# Patient Record
Sex: FEMALE | Race: WHITE | NOT HISPANIC OR LATINO | Employment: PART TIME
[De-identification: names, ages, dates, MRNs, and addresses within clinical notes are randomized per-mention and may not be internally consistent; named-entity substitution may affect disease eponyms.]

---

## 2017-09-03 ENCOUNTER — HEALTH MAINTENANCE LETTER (OUTPATIENT)
Age: 42
End: 2017-09-03

## 2017-10-18 DIAGNOSIS — F33.9 EPISODE OF RECURRENT MAJOR DEPRESSIVE DISORDER, UNSPECIFIED DEPRESSION EPISODE SEVERITY (H): ICD-10-CM

## 2017-10-18 NOTE — TELEPHONE ENCOUNTER
[facility]ax received from [facility] [address], [place]    sertraline (ZOLOFT) 100 MG tablet       Last Written Prescription Date: 10/7/16  Last Fill Quantity: 90, # refills: 3  Last Office Visit with [facility] [place] care provider:  8/25/16 ([facility] [place]um)  Future Office Visit: none        Last PHQ-9 score on record=   PHQ-9 SCORE 8/25/2016   Total Score -   Total Score 15

## 2017-10-19 RX ORDER — SERTRALINE HYDROCHLORIDE 100 MG/1
100 TABLET, FILM COATED ORAL DAILY
Qty: 30 TABLET | Refills: 0 | Status: SHIPPED | OUTPATIENT
Start: 2017-10-19 | End: 2017-11-21

## 2017-10-30 DIAGNOSIS — F32.9 MAJOR DEPRESSION: ICD-10-CM

## 2017-10-30 RX ORDER — SERTRALINE HYDROCHLORIDE 100 MG/1
TABLET, FILM COATED ORAL
Qty: 90 TABLET | Refills: 3 | OUTPATIENT
Start: 2017-10-30

## 2017-11-21 ENCOUNTER — OFFICE VISIT (OUTPATIENT)
Dept: OBGYN | Facility: CLINIC | Age: 42
End: 2017-11-21
Payer: COMMERCIAL

## 2017-11-21 VITALS
WEIGHT: 190 LBS | HEIGHT: 67 IN | SYSTOLIC BLOOD PRESSURE: 100 MMHG | DIASTOLIC BLOOD PRESSURE: 68 MMHG | BODY MASS INDEX: 29.82 KG/M2

## 2017-11-21 DIAGNOSIS — Z12.31 ENCOUNTER FOR SCREENING MAMMOGRAM FOR MALIGNANT NEOPLASM OF BREAST: ICD-10-CM

## 2017-11-21 DIAGNOSIS — G43.109 MIGRAINE WITH AURA AND WITHOUT STATUS MIGRAINOSUS, NOT INTRACTABLE: ICD-10-CM

## 2017-11-21 DIAGNOSIS — Z01.419 ENCOUNTER FOR GYNECOLOGICAL EXAMINATION WITHOUT ABNORMAL FINDING: Primary | ICD-10-CM

## 2017-11-21 DIAGNOSIS — F33.9 EPISODE OF RECURRENT MAJOR DEPRESSIVE DISORDER, UNSPECIFIED DEPRESSION EPISODE SEVERITY (H): ICD-10-CM

## 2017-11-21 DIAGNOSIS — R63.5 WEIGHT GAIN: ICD-10-CM

## 2017-11-21 PROCEDURE — 36415 COLL VENOUS BLD VENIPUNCTURE: CPT | Performed by: NURSE PRACTITIONER

## 2017-11-21 PROCEDURE — 84443 ASSAY THYROID STIM HORMONE: CPT | Performed by: NURSE PRACTITIONER

## 2017-11-21 PROCEDURE — 99396 PREV VISIT EST AGE 40-64: CPT | Performed by: NURSE PRACTITIONER

## 2017-11-21 PROCEDURE — 87624 HPV HI-RISK TYP POOLED RSLT: CPT | Performed by: NURSE PRACTITIONER

## 2017-11-21 PROCEDURE — G0145 SCR C/V CYTO,THINLAYER,RESCR: HCPCS | Performed by: NURSE PRACTITIONER

## 2017-11-21 RX ORDER — SERTRALINE HYDROCHLORIDE 100 MG/1
100 TABLET, FILM COATED ORAL DAILY
Qty: 90 TABLET | Refills: 3 | Status: SHIPPED | OUTPATIENT
Start: 2017-11-21 | End: 2018-12-24

## 2017-11-21 RX ORDER — ELETRIPTAN HYDROBROMIDE 40 MG/1
40 TABLET, FILM COATED ORAL
Qty: 18 TABLET | Refills: 1 | Status: SHIPPED | OUTPATIENT
Start: 2017-11-21 | End: 2019-02-26

## 2017-11-21 ASSESSMENT — ANXIETY QUESTIONNAIRES
2. NOT BEING ABLE TO STOP OR CONTROL WORRYING: MORE THAN HALF THE DAYS
3. WORRYING TOO MUCH ABOUT DIFFERENT THINGS: NOT AT ALL
7. FEELING AFRAID AS IF SOMETHING AWFUL MIGHT HAPPEN: NOT AT ALL
5. BEING SO RESTLESS THAT IT IS HARD TO SIT STILL: NOT AT ALL
6. BECOMING EASILY ANNOYED OR IRRITABLE: NEARLY EVERY DAY
IF YOU CHECKED OFF ANY PROBLEMS ON THIS QUESTIONNAIRE, HOW DIFFICULT HAVE THESE PROBLEMS MADE IT FOR YOU TO DO YOUR WORK, TAKE CARE OF THINGS AT HOME, OR GET ALONG WITH OTHER PEOPLE: SOMEWHAT DIFFICULT
1. FEELING NERVOUS, ANXIOUS, OR ON EDGE: MORE THAN HALF THE DAYS
GAD7 TOTAL SCORE: 8

## 2017-11-21 ASSESSMENT — PATIENT HEALTH QUESTIONNAIRE - PHQ9
5. POOR APPETITE OR OVEREATING: SEVERAL DAYS
SUM OF ALL RESPONSES TO PHQ QUESTIONS 1-9: 8

## 2017-11-21 NOTE — LETTER
11/22/2017     Prema Mike  [id number] [facility] [address]        Prema Mike your lab results came back normal.       Results for orders placed or performed in visit on 11/21/17   TSH   Result Value Ref Range    TSH 2.29 0.40 - 4.00 mU/L       Call with questions. Have a good year!    Cordially,    NEO Durham CNP

## 2017-11-21 NOTE — PROGRESS NOTES
"  Prema is a 42 year old  female who presents for annual exam.     Besides routine health maintenance, has some health concerns to discuss with provider.    HPI:  Pt here today for her annual exam. She is frustrated with continual weight gain since having her 3rd child. She never did loose the \"baby weight\" and has gone up since. She also needs a refill of her zoloft and relpax.     She has never had a mammogram. She was not aware that they should be done yearly.     We will update her pap smear today. She has had a LEEP when she was in her \"teens\".   The patient has primary care with ANTONY Alvarez as needed.      GYNECOLOGIC HISTORY:    Patient's last menstrual period was 2017.  Her current contraception method is: condoms.  She  reports that she has never smoked. She has never used smokeless tobacco.  Patient is sexually active.  STD testing offered?  Declined     Last PHQ-9 score on record =   PHQ-9 SCORE 2017   Total Score -   Total Score 8     Last GAD7 score on record =   GALI-7 SCORE 2017   Total Score -   Total Score 8     Alcohol Score = 2    HEALTH MAINTENANCE:  Cholesterol:   Cholesterol   Date Value Ref Range Status   2015 157 115 - 199 mg/dL Final   2013 219 (A) 125 - 200 mg/dL Final   Last Mammo: has never had mammo, needs baseline, will schedule  Pap:   Lab Results   Component Value Date    PAP NIL 2008    PAP NIL 2007   Colonoscopy:  N/A, Result: not applicable, Next Colonoscopy: due age 50  Dexa:  Never  Health maintenance updated:  yes    HISTORY:  Obstetric History       T2      L2     SAB0   TAB0   Ectopic0   Multiple0   Live Births2       # Outcome Date GA Lbr Rylan/2nd Weight Sex Delivery Anes PTL Lv   3 Term 06/15/16 38w1d 03:05 / 00:11 6 lb 15.8 oz (3.17 kg) M Vag-Spont Local        Apgar1:  8                Apgar5: 9   2 Term 07 38w0d  7 lb 12 oz (3.515 kg) F  EPI  SHANNAN      Name: Kimi   1  04 36w0d  6 lb 9 oz " (2.977 kg) M  EPI  SHANNAN      Name: Susi          Patient Active Problem List   Diagnosis     Migraine with aura     Depressive disorder, not elsewhere classified     Rash and other nonspecific skin eruption     Depression     Spastic colon     Herpes simplex     Cervical dysplasia     Low HDL (under 40)     Anxiety     Advanced maternal age in multigravida, first trimester     Advanced maternal age in multigravida, second trimester     Depression affecting pregnancy, antepartum     Club foot, fetal, affecting care of mother, antepartum, single gestation     Advanced maternal age in multigravida, third trimester     Multigravida of advanced maternal age in third trimester     Indication for care or intervention in labor or delivery     Pregnancy     Past Surgical History:   Procedure Laterality Date     LEEP TX, CERVICAL      done elsewhere      Social History   Substance Use Topics     Smoking status: Never Smoker     Smokeless tobacco: Never Used     Alcohol use 0.0 oz/week     0 Standard drinks or equivalent per week      Comment: 2 beers a week      Problem (# of Occurrences) Relation (Name,Age of Onset)    Cervical Cancer (2) Mother, Sister    Colon Cancer (1) Paternal Grandfather    Coronary Artery Disease (2) Paternal Grandfather, Maternal Grandfather    DIABETES (1) Paternal Grandfather    Hyperlipidemia (8) Paternal Grandfather, Paternal Grandmother, Brother, Mother, Maternal Grandmother, Father, Sister, Other (aunt)    Hypertension (2) Paternal Grandfather, Mother    Other Cancer (1) Maternal Grandmother: OVARIAN / UTERINE             Current Outpatient Prescriptions   Medication Sig     Multiple Vitamins-Minerals (MULTIVITAMIN PO)      sertraline (ZOLOFT) 100 MG tablet Take 1 tablet (100 mg) by mouth daily     eletriptan (RELPAX) 40 MG tablet Take 1 tablet (40 mg) by mouth at onset of headache for migraine May repeat dose in 2 hours.  Do not exceed 80 mg in 24 hours     [DISCONTINUED] sertraline (ZOLOFT)  "100 MG tablet Take 1 tablet (100 mg) by mouth daily     No current facility-administered medications for this visit.      Allergies   Allergen Reactions     No Known Allergies        Past medical, surgical, social and family histories were reviewed and updated in EPIC.    ROS:   12 point review of systems negative other than symptoms noted below.  Constitutional: Fatigue and Weight Gain  Eyes: Vision Loss  Breast: Tenderness  Gastrointestinal: Abdominal Pain and Bloating  Genitourinary: Night Sweats and Pelvic Pain  Skin: Acne and Skin Dryness  Musculoskeletal: Joint Pain  Psychiatric: Anxiety and Anaya    EXAM:  /68  Ht 5' 7\" (1.702 m)  Wt 190 lb (86.2 kg)  LMP 11/01/2017  BMI 29.76 kg/m2   BMI: Body mass index is 29.76 kg/(m^2).    PHYSICAL EXAM:  Constitutional:  Appearance: Well nourished, well developed, alert, in no acute distress  Neck:  Lymph Nodes:  No lymphadenopathy present    Thyroid:  Gland size normal, nontender, no nodules or masses present  on palpation  Chest:  Respiratory Effort:  Breathing unlabored  Cardiovascular:    Heart: Auscultation:  Regular rate, normal rhythm, no murmurs present  Breasts: Inspection of Breasts:  No lymphadenopathy present., Palpation of Breasts and Axillae:  No masses present on palpation, no breast tenderness., Axillary Lymph Nodes:  No lymphadenopathy present. and No nodularity, asymmetry or nipple discharge bilaterally.  Gastrointestinal:   Abdominal Examination:  Abdomen nontender to palpation, tone normal without rigidity or guarding, no masses present, umbilicus without lesions   Liver and Spleen:  No hepatomegaly present, liver nontender to palpation    Hernias:  No hernias present  Lymphatic: Lymph Nodes:  No other lymphadenopathy present  Skin:  General Inspection:  No rashes present, no lesions present, no areas of  discoloration    Genitalia and Groin:  No rashes present, no lesions present, no areas of  discoloration, no masses " present  Neurologic/Psychiatric:    Mental Status:  Oriented X3     Pelvic Exam:  External Genitalia:     Normal appearance for age, no discharge present, no tenderness present, no inflammatory lesions present, color normal  Vagina:     Normal vaginal vault without central or paravaginal defects, no discharge present, no inflammatory lesions present, no masses present  Bladder:     Nontender to palpation  Urethra:   Urethral Body:  Urethra palpation normal, urethra structural support normal   Urethral Meatus:  No erythema or lesions present  Cervix:     Appearance healthy, no lesions present, nontender to palpation, no bleeding present, S/P LEEP  Uterus:     Uterus: firm, normal sized and nontender, anteverted in position.   Adnexa:     No adnexal tenderness present, no adnexal masses present  Perineum:     Perineum within normal limits, no evidence of trauma, no rashes or skin lesions present  Anus:     Anus within normal limits, no hemorrhoids present  Inguinal Lymph Nodes:     No lymphadenopathy present  Pubic Hair:     Normal pubic hair distribution for age  Genitalia and Groin:     No rashes present, no lesions present, no areas of discoloration, no masses present      COUNSELING:   Special attention given to:        Regular exercise       Healthy diet/nutrition    BMI: Body mass index is 29.76 kg/(m^2).  Weight management plan: Discussed healthy diet and exercise guidelines and patient will follow up in 12 months in clinic to re-evaluate.    ASSESSMENT:  42 year old female with satisfactory annual exam.    ICD-10-CM    1. Encounter for gynecological examination without abnormal finding Z01.419 Pap imaged thin layer screen with HPV - recommended age 30 - 65     HPV High Risk Types DNA Cervical   2. Episode of recurrent major depressive disorder, unspecified depression episode severity (H) F33.9 sertraline (ZOLOFT) 100 MG tablet   3. Encounter for screening mammogram for malignant neoplasm of breast Z12.31 *MA  Screening Digital Bilateral   4. Migraine with aura and without status migrainosus, not intractable G43.109 eletriptan (RELPAX) 40 MG tablet   5. Weight gain R63.5 TSH       PLAN:  Overweight female, needs mammogram. TSH for weight gain. She needs to ramp up her exercise and protein intake.     NEO Durham CNP

## 2017-11-21 NOTE — MR AVS SNAPSHOT
"              After Visit Summary   11/21/2017    Prema Mike    MRN: [id number]           Patient Information     Date Of Birth          1975        Visit Information        Provider Department      11/21/2017 10:00 AM Shefali Waldron APRN CNP [facility]        Today's Diagnoses     Encounter for gynecological examination without abnormal finding    -  1    Episode of recurrent major depressive disorder, unspecified depression episode severity (H)        Encounter for screening mammogram for malignant neoplasm of breast        Migraine with aura and without status migrainosus, not intractable        Weight gain           Follow-ups after your visit        Follow-up notes from your care team     Return in about 1 year (around 11/21/2018).      Your next 10 appointments already scheduled     Dec 04, 2017 10:00 AM NALLELY   MA SCREENING DIGITAL BILATERAL with WEMA1   [facility]    [address] [place] [address], [address]  [facility] [address] [phone]           Do not use any powder, lotion or deodorant under your arms or on your breast. If you do, we will ask you to remove it before your exam.  Wear comfortable, two-piece clothing.  If you have any allergies, tell your care team.  Bring any previous mammograms from other facilities or have them mailed to the breast center. Three-dimensional (3D) mammograms are available at [facility] locations in [facility], [facility], [facility], and [place]. [facility] locations include [place] and [facility] in [place]. Benefits of 3D mammograms include: - Improved rate of cancer detection - Decreases your chance of having to go back for more tests, which means fewer: - \"False-positive\" results (This means that there is an abnormal area but it isn't cancer.) - Invasive testing procedures, such as a biopsy or surgery - Can provide clearer " "images of the breast if you have dense breast tissue. 3D mammography is an optional exam that anyone can have with a 2D mammogram. It doesn't replace or take the place of a 2D mammogram. 2D mammograms remain an effective screening test for all women.  Not all insurance companies cover the cost of a 3D mammogram. Check with your insurance.              Future tests that were ordered for you today     Open Future Orders        Priority Expected Expires Ordered    *MA Screening Digital Bilateral Routine  11/21/2018 11/21/2017            Who to contact     If you have questions or need follow up information about today's clinic visit or your schedule please contact [facility] directly at [phone].  Normal or non-critical lab and imaging results will be communicated to you by Novatrishart, letter or phone within 4 business days after the clinic has received the results. If you do not hear from us within 7 days, please contact the clinic through Intentivat or phone. If you have a critical or abnormal lab result, we will notify you by phone as soon as possible.  Submit refill requests through CloudAmboÂ® or call your pharmacy and they will forward the refill request to us. Please allow 3 business days for your refill to be completed.          Additional Information About Your Visit        CloudAmboÂ® Information     CloudAmboÂ® gives you secure access to your electronic health record. If you see a primary care provider, you can also send messages to your care team and make appointments. If you have questions, please call your primary care clinic.  If you do not have a primary care provider, please call [phone] and they will assist you.        Care EveryWhere ID     This is your Care EveryWhere ID. This could be used by other organizations to access your [facility] medical records  [id number]        Your Vitals Were     Height Last Period BMI (Body Mass Index)             5' 7\" (1.702 m) 11/01/2017 29.76 kg/m2    "       Blood Pressure from Last 3 Encounters:   11/21/17 100/68   08/25/16 112/70   06/16/16 119/70    Weight from Last 3 Encounters:   11/21/17 190 lb (86.2 kg)   08/25/16 178 lb (80.7 kg)   06/09/16 189 lb (85.7 kg)              We Performed the Following     HPV High Risk Types DNA Cervical     Pap imaged thin layer screen with HPV - recommended age 30 - 65     TSH          Today's Medication Changes          These changes are accurate as of: 11/21/17 10:55 AM.  If you have any questions, ask your nurse or doctor.               Stop taking these medicines if you haven't already. Please contact your care team if you have questions.     prenatal multivitamin plus iron 27-0.8 MG Tabs per tablet   Stopped by:  Shefali Waldron APRN CNP                Where to get your medicines      These medications were sent to [place] [id number] [address]  [facility]  [address]  [place] , [facility] [address]     Phone:  [phone]     eletriptan 40 MG tablet    sertraline 100 MG tablet                Primary Care Provider Office Phone # Fax #    Leonor Justen Govea [phone]       [facility] [address] [facility] [address]        Equal Access to Services     YAMINI GE AH: Hadii darren lunsford hadasho Soomaali, waaxda luqadaha, qaybta kaalmada adeegyada, abel johnson haykatherine ho. So [facility] [phone].    ATENCIÓN: Si habla español, tiene a schuler disposición servicios gratuitos de asistencia lingüística. [facility] [phone].    We comply with applicable federal civil rights laws and [place] laws. We do not discriminate on the basis of race, color, national origin, age, disability, sex, sexual orientation, or gender identity.            Thank you!     Thank you for choosing [facility]  for your care. Our goal is always to provide you with excellent care. Hearing back from our patients is one way we can continue to improve our  services. Please take a few minutes to complete the written survey that you may receive in the mail after your visit with us. Thank you!             Your Updated Medication List - Protect others around you: Learn how to safely use, store and throw away your medicines at www.disposemymeds.org.          This list is accurate as of: 11/21/17 10:55 AM.  Always use your most recent med list.                   Brand Name Dispense Instructions for use Diagnosis    eletriptan 40 MG tablet    RELPAX    18 tablet    Take 1 tablet (40 mg) by mouth at onset of headache for migraine May repeat dose in 2 hours.  Do not exceed 80 mg in 24 hours    Migraine with aura and without status migrainosus, not intractable       MULTIVITAMIN PO           sertraline 100 MG tablet    ZOLOFT    90 tablet    Take 1 tablet (100 mg) by mouth daily    Episode of recurrent major depressive disorder, unspecified depression episode severity (H)

## 2017-11-22 LAB — TSH SERPL DL<=0.005 MIU/L-ACNC: 2.29 MU/L (ref 0.4–4)

## 2017-11-22 ASSESSMENT — ANXIETY QUESTIONNAIRES: GAD7 TOTAL SCORE: 8

## 2017-11-26 ENCOUNTER — HEALTH MAINTENANCE LETTER (OUTPATIENT)
Age: 42
End: 2017-11-26

## 2017-11-27 LAB
COPATH REPORT: NORMAL
PAP: NORMAL

## 2017-11-28 LAB
FINAL DIAGNOSIS: NORMAL
HPV HR 12 DNA CVX QL NAA+PROBE: NEGATIVE
HPV16 DNA SPEC QL NAA+PROBE: NEGATIVE
HPV18 DNA SPEC QL NAA+PROBE: NEGATIVE
SPECIMEN DESCRIPTION: NORMAL

## 2017-12-04 ENCOUNTER — RADIANT APPOINTMENT (OUTPATIENT)
Dept: MAMMOGRAPHY | Facility: CLINIC | Age: 42
End: 2017-12-04
Payer: COMMERCIAL

## 2017-12-04 DIAGNOSIS — Z12.31 ENCOUNTER FOR SCREENING MAMMOGRAM FOR MALIGNANT NEOPLASM OF BREAST: ICD-10-CM

## 2017-12-04 PROCEDURE — G0202 SCR MAMMO BI INCL CAD: HCPCS | Mod: TC

## 2018-12-24 DIAGNOSIS — F33.9 EPISODE OF RECURRENT MAJOR DEPRESSIVE DISORDER, UNSPECIFIED DEPRESSION EPISODE SEVERITY (H): ICD-10-CM

## 2018-12-24 RX ORDER — SERTRALINE HYDROCHLORIDE 100 MG/1
100 TABLET, FILM COATED ORAL DAILY
Qty: 30 TABLET | Refills: 0 | Status: SHIPPED | OUTPATIENT
Start: 2018-12-24 | End: 2019-02-16

## 2018-12-24 NOTE — TELEPHONE ENCOUNTER
"Requested Prescriptions   Pending Prescriptions Disp Refills     sertraline (ZOLOFT) 100 MG tablet 90 tablet 3     Sig: Take 1 tablet (100 mg) by mouth daily    SSRIs Protocol Failed - 12/24/2018  9:31 AM       Failed - PHQ-9 score less than 5 in past 6 months    Please review last PHQ-9 score.          Failed - Recent (6 mo) or future (30 days) visit within the authorizing provider's specialty    Patient had office visit in the last 6 months or has a visit in the next 30 days with authorizing provider or within the authorizing provider's specialty.  See \"Patient Info\" tab in inbasket, or \"Choose Columns\" in Meds & Orders section of the refill encounter.           Passed - Patient is age 18 or older       Passed - No active pregnancy on record       Passed - No positive pregnancy test in last 12 months        Last Written Prescription Date:  11/21/17  Last Fill Quantity: 90,  # refills: 3   Last office visit: 11/21/2017 with prescribing provider:  Shefali Waldron   Future Office Visit: None    "

## 2018-12-24 NOTE — TELEPHONE ENCOUNTER
Medication is being filled for 1 time refill only due to:  Patient needs to be seen because it has been more than one year since last visit. Message sent to pharmacy to inform pt no further refills until seen in office for annual med check.

## 2019-01-21 DIAGNOSIS — F33.9 EPISODE OF RECURRENT MAJOR DEPRESSIVE DISORDER, UNSPECIFIED DEPRESSION EPISODE SEVERITY (H): ICD-10-CM

## 2019-01-21 RX ORDER — SERTRALINE HYDROCHLORIDE 100 MG/1
100 TABLET, FILM COATED ORAL DAILY
Qty: 30 TABLET | Refills: 0 | OUTPATIENT
Start: 2019-01-21

## 2019-01-21 NOTE — TELEPHONE ENCOUNTER
Pt due for annual, no appt scheduled. Pt already received one month extension. Rx denied.  Pt needs appointment

## 2019-01-21 NOTE — TELEPHONE ENCOUNTER
"Requested Prescriptions   Pending Prescriptions Disp Refills     sertraline (ZOLOFT) 100 MG tablet 30 tablet 0     Sig: Take 1 tablet (100 mg) by mouth daily    SSRIs Protocol Failed - 1/21/2019  2:16 PM       Failed - PHQ-9 score less than 5 in past 6 months    Please review last PHQ-9 score.          Failed - Recent (6 mo) or future (30 days) visit within the authorizing provider's specialty    Patient had office visit in the last 6 months or has a visit in the next 30 days with authorizing provider or within the authorizing provider's specialty.  See \"Patient Info\" tab in inbasket, or \"Choose Columns\" in Meds & Orders section of the refill encounter.           Passed - Medication is active on med list       Passed - Patient is age 18 or older       Passed - No active pregnancy on record       Passed - No positive pregnancy test in last 12 months        Last Written Prescription Date:  12/24/2018  Last Fill Quantity: 30,  # refills: 0   Last office visit: 11/21/2017 with prescribing provider:  Annual with Chivo   Future Office Visit:  NA    "

## 2019-01-23 DIAGNOSIS — F33.9 EPISODE OF RECURRENT MAJOR DEPRESSIVE DISORDER, UNSPECIFIED DEPRESSION EPISODE SEVERITY (H): ICD-10-CM

## 2019-01-23 RX ORDER — SERTRALINE HYDROCHLORIDE 100 MG/1
100 TABLET, FILM COATED ORAL DAILY
Qty: 30 TABLET | Refills: 0 | OUTPATIENT
Start: 2019-01-23

## 2019-01-23 NOTE — TELEPHONE ENCOUNTER
"Requested Prescriptions   Pending Prescriptions Disp Refills     sertraline (ZOLOFT) 100 MG tablet 30 tablet 0     Sig: Take 1 tablet (100 mg) by mouth daily    SSRIs Protocol Failed - 1/23/2019  3:44 PM       Failed - PHQ-9 score less than 5 in past 6 months    Please review last PHQ-9 score.          Failed - Recent (6 mo) or future (30 days) visit within the authorizing provider's specialty    Patient had office visit in the last 6 months or has a visit in the next 30 days with authorizing provider or within the authorizing provider's specialty.  See \"Patient Info\" tab in inbasket, or \"Choose Columns\" in Meds & Orders section of the refill encounter.           Passed - Medication is active on med list       Passed - Patient is age 18 or older       Passed - No active pregnancy on record       Passed - No positive pregnancy test in last 12 months          Pt due for annual, no appt scheduled. Pt already received one month extension. Rx denied.   Mulu Crockett RN on 1/23/2019 at 3:45 PM    "

## 2019-02-09 DIAGNOSIS — F33.9 EPISODE OF RECURRENT MAJOR DEPRESSIVE DISORDER, UNSPECIFIED DEPRESSION EPISODE SEVERITY (H): ICD-10-CM

## 2019-02-11 RX ORDER — SERTRALINE HYDROCHLORIDE 100 MG/1
TABLET, FILM COATED ORAL
Qty: 30 TABLET | Refills: 0 | OUTPATIENT
Start: 2019-02-11

## 2019-02-11 NOTE — TELEPHONE ENCOUNTER
"Requested Prescriptions   Pending Prescriptions Disp Refills     sertraline (ZOLOFT) 100 MG tablet [Pharmacy Med Name: SERTRALINE  MG TABLET] 30 tablet 0     Sig: TAKE 1 TABLET BY MOUTH EVERY DAY. MAKE APPT    SSRIs Protocol Failed - 2/9/2019  4:16 PM       Failed - PHQ-9 score less than 5 in past 6 months    Please review last PHQ-9 score.          Failed - Recent (6 mo) or future (30 days) visit within the authorizing provider's specialty    Patient had office visit in the last 6 months or has a visit in the next 30 days with authorizing provider or within the authorizing provider's specialty.  See \"Patient Info\" tab in inbasket, or \"Choose Columns\" in Meds & Orders section of the refill encounter.           Passed - Medication is active on med list       Passed - Patient is age 18 or older       Passed - No active pregnancy on record       Passed - No positive pregnancy test in last 12 months      Pt due for annual, no appt scheduled. Pt already received one month extension. Rx denied.   Mulu Crockett RN on 2/11/2019 at 10:18 AM      "

## 2019-02-16 DIAGNOSIS — F33.9 EPISODE OF RECURRENT MAJOR DEPRESSIVE DISORDER, UNSPECIFIED DEPRESSION EPISODE SEVERITY (H): ICD-10-CM

## 2019-02-18 RX ORDER — SERTRALINE HYDROCHLORIDE 100 MG/1
TABLET, FILM COATED ORAL
Qty: 30 TABLET | Refills: 0 | Status: SHIPPED | OUTPATIENT
Start: 2019-02-18 | End: 2019-02-26

## 2019-02-18 NOTE — TELEPHONE ENCOUNTER
"Requested Prescriptions   Pending Prescriptions Disp Refills     sertraline (ZOLOFT) 100 MG tablet [Pharmacy Med Name: SERTRALINE  MG TABLET] 30 tablet 0     Sig: TAKE 1 TABLET BY MOUTH EVERY DAY. MAKE APPT    SSRIs Protocol Failed - 2/16/2019  3:42 PM       Failed - PHQ-9 score less than 5 in past 6 months    Please review last PHQ-9 score.          Failed - Recent (6 mo) or future (30 days) visit within the authorizing provider's specialty    Patient had office visit in the last 6 months or has a visit in the next 30 days with authorizing provider or within the authorizing provider's specialty.  See \"Patient Info\" tab in inbasket, or \"Choose Columns\" in Meds & Orders section of the refill encounter.           Passed - Medication is active on med list       Passed - Patient is age 18 or older       Passed - No active pregnancy on record       Passed - No positive pregnancy test in last 12 months      Last Written Prescription Date:  12/24/18  Last Fill Quantity: 30,  # refills: 0   Last office visit: 11/21/2017 with prescribing provider:  Shefali Waldron   Future Office Visit:  None    Pt due for annual, no appt scheduled. Pt already received one month extension. Rx denied.   Mulu Crockett RN on 2/18/2019 at 11:00 AM        "

## 2019-02-25 NOTE — PROGRESS NOTES
Prema is a 43 year old  female who presents for annual exam.     Besides routine health maintenance, she has no other health concerns today .    HPI:  The patient's PCP is Leonor Govea MD.  Pt here today for her annual exam and mammogram. She is due for her lipids. Last in . Is not fasting today. Will order for future.     Needs refills on Relpax and sertraline.     Had vulvar varicosities when pregnant, and they were fine up until a few weeks ago. She recently had one rupture in the shower.     S/p Leep in her teens. NIL since. Will pap every 3 years. Last pap in , NIL neg HPV      GYNECOLOGIC HISTORY:    Patient's last menstrual period was 2019.  Her current contraception method is: condoms.  She  reports that  has never smoked. she has never used smokeless tobacco.    Patient is sexually active.  STD testing offered?  Declined  Last PHQ-9 score on record =   PHQ-9 SCORE 2019   PHQ-9 Total Score -   PHQ-9 Total Score 8     Last GAD7 score on record =   GALI-7 SCORE 2019   Total Score -   Total Score 11     Alcohol Score = 2    HEALTH MAINTENANCE:  Cholesterol:   Cholesterol   Date Value Ref Range Status   2015 157 115 - 199 mg/dL Final   2013 219 (A) 125 - 200 mg/dL Final     Last Mammo: 17, Result: normal, Next Mammo: today   Pap:   Lab Results   Component Value Date    PAP NIL HPV NEG 2017    PAP NIL 2008    PAP NIL 2007      Colonoscopy:  NEVER, Result: not applicable, Next Colonoscopy: Due age 50.   Dexa:  NA    Health maintenance updated:  yes    HISTORY:  Obstetric History       T2      L2     SAB0   TAB0   Ectopic0   Multiple0   Live Births3       # Outcome Date GA Lbr Rylan/2nd Weight Sex Delivery Anes PTL Lv   3 Term 06/15/16 38w1d 03:05 / 00:11 3.17 kg (6 lb 15.8 oz) M Vag-Spont Local        Apgar1:  8                Apgar5: 9   2 Term 07 38w0d  3.515 kg (7 lb 12 oz) F  EPI  SHANNAN      Name: Kimi Cm   04 36w0d  2.977 kg (6 lb 9 oz) M  EPI  SHANNAN      Name: Susi          Patient Active Problem List   Diagnosis     Migraine with aura     Depressive disorder, not elsewhere classified     Rash and other nonspecific skin eruption     Depression     Spastic colon     Herpes simplex     Low HDL (under 40)     Anxiety     Advanced maternal age in multigravida, first trimester     Advanced maternal age in multigravida, second trimester     Depression affecting pregnancy, antepartum     Club foot, fetal, affecting care of mother, antepartum, single gestation     Advanced maternal age in multigravida, third trimester     Multigravida of advanced maternal age in third trimester     Indication for care or intervention in labor or delivery     Pregnancy     Past Surgical History:   Procedure Laterality Date     LEEP TX, CERVICAL      done elsewhere      Social History     Tobacco Use     Smoking status: Never Smoker     Smokeless tobacco: Never Used   Substance Use Topics     Alcohol use: Yes     Alcohol/week: 0.0 oz     Frequency: 2-4 times a month     Drinks per session: 1 or 2     Binge frequency: Never     Comment: 2 beers a week      Problem (# of Occurrences) Relation (Name,Age of Onset)    Cervical Cancer (2) Mother, Sister    Colon Cancer (1) Paternal Grandfather    Coronary Artery Disease (2) Paternal Grandfather, Maternal Grandfather    Diabetes (1) Paternal Grandfather    Hyperlipidemia (8) Paternal Grandfather, Paternal Grandmother, Brother, Mother, Maternal Grandmother, Father, Sister, Other (aunt)    Hypertension (2) Paternal Grandfather, Mother    Other Cancer (1) Maternal Grandmother: OVARIAN / UTERINE             Current Outpatient Medications   Medication Sig     eletriptan (RELPAX) 40 MG tablet Take 1 tablet (40 mg) by mouth at onset of headache for migraine May repeat dose in 2 hours.  Do not exceed 80 mg in 24 hours     Multiple Vitamins-Minerals (MULTIVITAMIN PO)      sertraline (ZOLOFT) 100  "MG tablet Take 1 tablet (100 mg) by mouth daily     No current facility-administered medications for this visit.      Allergies   Allergen Reactions     No Known Allergies        Past medical, surgical, social and family histories were reviewed and updated in EPIC.    ROS:   12 point review of systems negative other than symptoms noted below.  Constitutional: Fatigue  Eyes: Vision Loss  Gastrointestinal: Bloating  Genitourinary: Cramps, Heavy Bleeding with Period, Hot Flashes, Night Sweats and Vaginal Discharge  Skin: Acne and Skin Dryness  Psychiatric: Anxiety, Depression, Difficulty Sleeping and Anaya    EXAM:  /64   Pulse 68   Ht 1.702 m (5' 7\")   Wt 88.9 kg (196 lb)   LMP 02/01/2019   BMI 30.70 kg/m     BMI: Body mass index is 30.7 kg/m .    PHYSICAL EXAM:  Constitutional:  Appearance: Well nourished, well developed, alert, in no acute distress  Neck:  Lymph Nodes:  No lymphadenopathy present    Thyroid:  Gland size normal, nontender, no nodules or masses present  on palpation  Chest:  Respiratory Effort:  Breathing unlabored  Cardiovascular:    Heart: Auscultation:  Regular rate, normal rhythm, no murmurs present  Breasts: Inspection of Breasts:  No lymphadenopathy present., Palpation of Breasts and Axillae:  No masses present on palpation, no breast tenderness., Axillary Lymph Nodes:  No lymphadenopathy present. and No nodularity, asymmetry or nipple discharge bilaterally.  Gastrointestinal:   Abdominal Examination:  Abdomen nontender to palpation, tone normal without rigidity or guarding, no masses present, umbilicus without lesions   Liver and Spleen:  No hepatomegaly present, liver nontender to palpation    Hernias:  No hernias present  Lymphatic: Lymph Nodes:  No other lymphadenopathy present  Skin:  General Inspection:  No rashes present, no lesions present, no areas of  discoloration    Genitalia and Groin:  No rashes present, no lesions present, no areas of  discoloration, no masses " present  Neurologic/Psychiatric:    Mental Status:  Oriented X3     Pelvic Exam:  External Genitalia:     Normal appearance for age, no discharge present, no tenderness present, no inflammatory lesions present, color normal- multiple bilateral vulvar varicosities noted. One on lower 1/3 labia is tender.   Vagina:     Normal vaginal vault without central or paravaginal defects, no discharge present, no inflammatory lesions present, no masses present  Bladder:     Nontender to palpation  Urethra:   Urethral Body:  Urethra palpation normal, urethra structural support normal   Urethral Meatus:  No erythema or lesions present  Cervix:     Appearance healthy, no lesions present, nontender to palpation, no bleeding present  Uterus:     Uterus: firm, normal sized and nontender, anteverted in position.   Adnexa:     No adnexal tenderness present, no adnexal masses present  Perineum:     Perineum within normal limits, no evidence of trauma, no rashes or skin lesions present  Anus:     Anus within normal limits, no hemorrhoids present  Inguinal Lymph Nodes:     No lymphadenopathy present  Pubic Hair:     Normal pubic hair distribution for age  Genitalia and Groin:     No rashes present, no lesions present, no areas of discoloration, no masses present      COUNSELING:   Special attention given to:        Regular exercise       Healthy diet/nutrition       Contraception    BMI: Body mass index is 30.7 kg/m .  Weight management plan: Discussed healthy diet and exercise guidelines    ASSESSMENT:  43 year old female with satisfactory annual exam.    ICD-10-CM    1. Encounter for gynecological examination without abnormal finding Z01.419    2. Episode of recurrent major depressive disorder, unspecified depression episode severity (H) F33.9 sertraline (ZOLOFT) 100 MG tablet   3. Vulval varices I86.3    4. Encounter for lipid screening for cardiovascular disease Z13.220 Lipid panel reflex to direct LDL Fasting    Z13.6    5. Screening  for metabolic disorder Z13.228 **Comprehensive metabolic panel FUTURE anytime   6. Migraine with aura and without status migrainosus, not intractable G43.109 eletriptan (RELPAX) 40 MG tablet       PLAN:  Normal gyn exam. Varicosities noted. Discussed sclerotherapy if they are symptomatic. Pt will think about it. Fasting labs in future. meds refilled. No pap collected today. Pap NIL neg HPV last year. Will pap every 3 years.     NEO Durham CNP

## 2019-02-26 ENCOUNTER — ANCILLARY PROCEDURE (OUTPATIENT)
Dept: MAMMOGRAPHY | Facility: CLINIC | Age: 44
End: 2019-02-26
Payer: COMMERCIAL

## 2019-02-26 ENCOUNTER — OFFICE VISIT (OUTPATIENT)
Dept: OBGYN | Facility: CLINIC | Age: 44
End: 2019-02-26
Payer: COMMERCIAL

## 2019-02-26 VITALS
DIASTOLIC BLOOD PRESSURE: 64 MMHG | BODY MASS INDEX: 30.76 KG/M2 | HEIGHT: 67 IN | SYSTOLIC BLOOD PRESSURE: 112 MMHG | HEART RATE: 68 BPM | WEIGHT: 196 LBS

## 2019-02-26 DIAGNOSIS — I86.3 VULVAL VARICES: ICD-10-CM

## 2019-02-26 DIAGNOSIS — Z13.220 ENCOUNTER FOR LIPID SCREENING FOR CARDIOVASCULAR DISEASE: ICD-10-CM

## 2019-02-26 DIAGNOSIS — Z13.6 ENCOUNTER FOR LIPID SCREENING FOR CARDIOVASCULAR DISEASE: ICD-10-CM

## 2019-02-26 DIAGNOSIS — Z01.419 ENCOUNTER FOR GYNECOLOGICAL EXAMINATION WITHOUT ABNORMAL FINDING: Primary | ICD-10-CM

## 2019-02-26 DIAGNOSIS — Z12.31 VISIT FOR SCREENING MAMMOGRAM: ICD-10-CM

## 2019-02-26 DIAGNOSIS — Z13.228 SCREENING FOR METABOLIC DISORDER: ICD-10-CM

## 2019-02-26 DIAGNOSIS — F33.9 EPISODE OF RECURRENT MAJOR DEPRESSIVE DISORDER, UNSPECIFIED DEPRESSION EPISODE SEVERITY (H): ICD-10-CM

## 2019-02-26 DIAGNOSIS — G43.109 MIGRAINE WITH AURA AND WITHOUT STATUS MIGRAINOSUS, NOT INTRACTABLE: ICD-10-CM

## 2019-02-26 PROCEDURE — 99396 PREV VISIT EST AGE 40-64: CPT | Performed by: NURSE PRACTITIONER

## 2019-02-26 PROCEDURE — 77063 BREAST TOMOSYNTHESIS BI: CPT | Mod: TC

## 2019-02-26 PROCEDURE — 77067 SCR MAMMO BI INCL CAD: CPT | Mod: TC

## 2019-02-26 RX ORDER — ELETRIPTAN HYDROBROMIDE 40 MG/1
40 TABLET, FILM COATED ORAL
Qty: 18 TABLET | Refills: 1 | Status: SHIPPED | OUTPATIENT
Start: 2019-02-26 | End: 2020-09-29

## 2019-02-26 RX ORDER — SERTRALINE HYDROCHLORIDE 100 MG/1
100 TABLET, FILM COATED ORAL DAILY
Qty: 90 TABLET | Refills: 3 | Status: SHIPPED | OUTPATIENT
Start: 2019-02-26 | End: 2020-04-21

## 2019-02-26 SDOH — HEALTH STABILITY: MENTAL HEALTH: HOW OFTEN DO YOU HAVE A DRINK CONTAINING ALCOHOL?: 2-4 TIMES A MONTH

## 2019-02-26 SDOH — HEALTH STABILITY: MENTAL HEALTH: HOW MANY STANDARD DRINKS CONTAINING ALCOHOL DO YOU HAVE ON A TYPICAL DAY?: 1 OR 2

## 2019-02-26 SDOH — HEALTH STABILITY: MENTAL HEALTH: HOW OFTEN DO YOU HAVE 6 OR MORE DRINKS ON ONE OCCASION?: NEVER

## 2019-02-26 ASSESSMENT — PATIENT HEALTH QUESTIONNAIRE - PHQ9
SUM OF ALL RESPONSES TO PHQ QUESTIONS 1-9: 8
5. POOR APPETITE OR OVEREATING: SEVERAL DAYS

## 2019-02-26 ASSESSMENT — ANXIETY QUESTIONNAIRES
GAD7 TOTAL SCORE: 11
2. NOT BEING ABLE TO STOP OR CONTROL WORRYING: MORE THAN HALF THE DAYS
7. FEELING AFRAID AS IF SOMETHING AWFUL MIGHT HAPPEN: SEVERAL DAYS
5. BEING SO RESTLESS THAT IT IS HARD TO SIT STILL: SEVERAL DAYS
3. WORRYING TOO MUCH ABOUT DIFFERENT THINGS: SEVERAL DAYS
IF YOU CHECKED OFF ANY PROBLEMS ON THIS QUESTIONNAIRE, HOW DIFFICULT HAVE THESE PROBLEMS MADE IT FOR YOU TO DO YOUR WORK, TAKE CARE OF THINGS AT HOME, OR GET ALONG WITH OTHER PEOPLE: SOMEWHAT DIFFICULT
1. FEELING NERVOUS, ANXIOUS, OR ON EDGE: NEARLY EVERY DAY
6. BECOMING EASILY ANNOYED OR IRRITABLE: MORE THAN HALF THE DAYS

## 2019-02-26 ASSESSMENT — MIFFLIN-ST. JEOR: SCORE: 1576.68

## 2019-02-27 ASSESSMENT — ANXIETY QUESTIONNAIRES: GAD7 TOTAL SCORE: 11

## 2020-04-21 DIAGNOSIS — F33.9 EPISODE OF RECURRENT MAJOR DEPRESSIVE DISORDER, UNSPECIFIED DEPRESSION EPISODE SEVERITY (H): ICD-10-CM

## 2020-04-21 RX ORDER — SERTRALINE HYDROCHLORIDE 100 MG/1
TABLET, FILM COATED ORAL
Qty: 30 TABLET | Refills: 0 | Status: SHIPPED | OUTPATIENT
Start: 2020-04-21 | End: 2020-05-27

## 2020-04-21 NOTE — TELEPHONE ENCOUNTER
"Requested Prescriptions   Pending Prescriptions Disp Refills     sertraline (ZOLOFT) 100 MG tablet [Pharmacy Med Name: SERTRALINE  MG TABLET] 90 tablet 3     Sig: TAKE 1 TABLET BY MOUTH EVERY DAY       SSRIs Protocol Failed - 4/21/2020 12:14 AM        Failed - PHQ-9 score less than 5 in past 6 months     Please review last PHQ-9 score.           Failed - Recent (6 mo) or future (30 days) visit within the authorizing provider's specialty     Patient had office visit in the last 6 months or has a visit in the next 30 days with authorizing provider or within the authorizing provider's specialty.  See \"Patient Info\" tab in inbasket, or \"Choose Columns\" in Meds & Orders section of the refill encounter.            Passed - Medication is active on med list        Passed - Patient is age 18 or older        Passed - No active pregnancy on record        Passed - No positive pregnancy test in last 12 months           One refill sent, telephone appointment needed  Mulu Crockett RN on 4/21/2020 at 8:33 AM    "

## 2020-05-27 DIAGNOSIS — F33.9 EPISODE OF RECURRENT MAJOR DEPRESSIVE DISORDER, UNSPECIFIED DEPRESSION EPISODE SEVERITY (H): ICD-10-CM

## 2020-05-27 RX ORDER — SERTRALINE HYDROCHLORIDE 100 MG/1
TABLET, FILM COATED ORAL
Qty: 30 TABLET | Refills: 0 | Status: SHIPPED | OUTPATIENT
Start: 2020-05-27 | End: 2020-07-10

## 2020-05-27 NOTE — TELEPHONE ENCOUNTER
"Requested Prescriptions   Pending Prescriptions Disp Refills     sertraline (ZOLOFT) 100 MG tablet [Pharmacy Med Name: SERTRALINE  MG TABLET] 30 tablet 0     Sig: TAKE 1 TABLET BY MOUTH EVERY DAY       SSRIs Protocol Failed - 5/27/2020  9:32 AM        Failed - PHQ-9 score less than 5 in past 6 months     Please review last PHQ-9 score.           Failed - Recent (6 mo) or future (30 days) visit within the authorizing provider's specialty     Patient had office visit in the last 6 months or has a visit in the next 30 days with authorizing provider or within the authorizing provider's specialty.  See \"Patient Info\" tab in inbasket, or \"Choose Columns\" in Meds & Orders section of the refill encounter.            Passed - Medication is active on med list        Passed - Patient is age 18 or older        Passed - No active pregnancy on record        Passed - No positive pregnancy test in last 12 months           One month sent, no further refills until pt has scheduled an appointment  Mulu Crockett RN on 5/27/2020 at 11:59 AM    "

## 2020-06-11 DIAGNOSIS — F33.9 EPISODE OF RECURRENT MAJOR DEPRESSIVE DISORDER, UNSPECIFIED DEPRESSION EPISODE SEVERITY (H): ICD-10-CM

## 2020-06-11 RX ORDER — SERTRALINE HYDROCHLORIDE 100 MG/1
100 TABLET, FILM COATED ORAL DAILY
Qty: 90 TABLET | Refills: 0 | OUTPATIENT
Start: 2020-06-11

## 2020-06-11 NOTE — TELEPHONE ENCOUNTER
"Requested Prescriptions   Pending Prescriptions Disp Refills     sertraline (ZOLOFT) 100 MG tablet 90 tablet 0     Sig: Take 1 tablet (100 mg) by mouth daily       SSRIs Protocol Failed - 6/11/2020  9:24 AM        Failed - PHQ-9 score less than 5 in past 6 months     Please review last PHQ-9 score.           Failed - Recent (6 mo) or future (30 days) visit within the authorizing provider's specialty     Patient had office visit in the last 6 months or has a visit in the next 30 days with authorizing provider or within the authorizing provider's specialty.  See \"Patient Info\" tab in inbasket, or \"Choose Columns\" in Meds & Orders section of the refill encounter.            Passed - Medication is active on med list        Passed - Patient is age 18 or older        Passed - No active pregnancy on record        Passed - No positive pregnancy test in last 12 months           *Requesting 90 days supply*  Last Written Prescription Date:  5/27/2020  Last Fill Quantity: 30,  # refills: 0   Last office visit: 2/26/2019 with prescribing provider:  Shefali Waldron   Future Office Visit:  None    Denied, pt needs an appointment  Mulu Crockett RN on 6/11/2020 at 9:28 AM        "

## 2020-07-07 DIAGNOSIS — F33.9 EPISODE OF RECURRENT MAJOR DEPRESSIVE DISORDER, UNSPECIFIED DEPRESSION EPISODE SEVERITY (H): ICD-10-CM

## 2020-07-07 RX ORDER — SERTRALINE HYDROCHLORIDE 100 MG/1
100 TABLET, FILM COATED ORAL DAILY
Qty: 30 TABLET | Refills: 0 | OUTPATIENT
Start: 2020-07-07

## 2020-07-07 NOTE — TELEPHONE ENCOUNTER
"Requested Prescriptions   Pending Prescriptions Disp Refills     sertraline (ZOLOFT) 100 MG tablet 30 tablet 0     Sig: Take 1 tablet (100 mg) by mouth daily       SSRIs Protocol Failed - 7/7/2020  1:40 PM        Failed - PHQ-9 score less than 5 in past 6 months     Please review last PHQ-9 score.           Failed - Recent (6 mo) or future (30 days) visit within the authorizing provider's specialty     Patient had office visit in the last 6 months or has a visit in the next 30 days with authorizing provider or within the authorizing provider's specialty.  See \"Patient Info\" tab in inbasket, or \"Choose Columns\" in Meds & Orders section of the refill encounter.            Passed - Medication is active on med list        Passed - Patient is age 18 or older        Passed - No active pregnancy on record        Passed - No positive pregnancy test in last 12 months           Last Written Prescription Date:  5/27/20  Last Fill Quantity: 30,  # refills: 0   Last office visit: 2/26/2019 with prescribing provider:  Shefali Waldron   Future Office Visit:  none  Denied  Appointment needed for further refills  Mulu Crockett RN on 7/7/2020 at 1:42 PM        "

## 2020-07-10 RX ORDER — SERTRALINE HYDROCHLORIDE 100 MG/1
100 TABLET, FILM COATED ORAL DAILY
Qty: 30 TABLET | Refills: 1 | Status: SHIPPED | OUTPATIENT
Start: 2020-07-10 | End: 2020-08-04

## 2020-07-10 NOTE — TELEPHONE ENCOUNTER
Next 5 appointments (look out 90 days)    Sep 29, 2020  2:30 PM CDT  PHYSICAL with NEO Durham CNP  [facility] ([facility]) [address] [address]  [phone]        Refill sent  Mulu Crockett RN on 7/10/2020 at 10:11 AM

## 2020-07-14 ENCOUNTER — OFFICE VISIT (OUTPATIENT)
Dept: URGENT CARE | Facility: URGENT CARE | Age: 45
End: 2020-07-14
Payer: COMMERCIAL

## 2020-07-14 ENCOUNTER — ANCILLARY PROCEDURE (OUTPATIENT)
Dept: GENERAL RADIOLOGY | Facility: CLINIC | Age: 45
End: 2020-07-14
Attending: PHYSICIAN ASSISTANT
Payer: COMMERCIAL

## 2020-07-14 VITALS
BODY MASS INDEX: 29.76 KG/M2 | WEIGHT: 190 LBS | OXYGEN SATURATION: 99 % | DIASTOLIC BLOOD PRESSURE: 62 MMHG | HEART RATE: 74 BPM | RESPIRATION RATE: 16 BRPM | TEMPERATURE: 97.9 F | SYSTOLIC BLOOD PRESSURE: 118 MMHG

## 2020-07-14 DIAGNOSIS — M25.572 ACUTE LEFT ANKLE PAIN: ICD-10-CM

## 2020-07-14 DIAGNOSIS — S93.402A SPRAIN OF LEFT ANKLE, UNSPECIFIED LIGAMENT, INITIAL ENCOUNTER: Primary | ICD-10-CM

## 2020-07-14 PROCEDURE — 73610 X-RAY EXAM OF ANKLE: CPT | Mod: LT

## 2020-07-14 PROCEDURE — 99203 OFFICE O/P NEW LOW 30 MIN: CPT | Performed by: PHYSICIAN ASSISTANT

## 2020-07-14 ASSESSMENT — ENCOUNTER SYMPTOMS
JOINT SWELLING: 1
ARTHRALGIAS: 0
MYALGIAS: 0
PSYCHIATRIC NEGATIVE: 1
GASTROINTESTINAL NEGATIVE: 1
NEUROLOGICAL NEGATIVE: 1
RESPIRATORY NEGATIVE: 1
CONSTITUTIONAL NEGATIVE: 1
CARDIOVASCULAR NEGATIVE: 1

## 2020-07-14 NOTE — PATIENT INSTRUCTIONS
Patient Education     Treating Ankle Sprains  Treatment will depend on how bad your sprain is. For a severe sprain, healing may take 3 months or more.  Right after your injury: Use R.I.C.E.    BIG: Rest: At first, keep weight off the ankle as much as you can. You may be given crutches to help you walk without putting weight on the ankle.    BIG: Ice: Put an ice pack on the ankle for 20 minutes. Remove the pack and wait at least 30 minutes. Repeat for up to 3 days. This helps reduce swelling.    BIG: Compression: To reduce swelling and keep the joint stable, you may need to wrap the ankle with an elastic bandage. For more severe sprains, you may need an ankle brace, a boot, or a cast.    BIG: Elevation: To reduce swelling, keep your ankle raised above your heart when you sit or lie down.  Medicine  Your healthcare provider may suggest oral nonsteroidal anti-inflammatory medicine (NSAIDs), such as ibuprofen. This relieves the pain and helps reduce swelling. Be sure to take your medicine as directed.  Exercises    After about 2 to 3 weeks, you may be given exercises to strengthen the ligaments and muscles in the ankle. Doing these exercises will help prevent another ankle sprain. Exercises may include standing on your toes and then on your heels and doing ankle curls.    Sit on the edge of a sturdy table or lie on your back.    Pull your toes toward you. Then point them away from you. Repeat for 2 to 3 minutes.  Date Last Reviewed: 1/1/2018 2000-2019 [employer]. [address] [place] [address], [place] [address]. All rights reserved. This information is not intended as a substitute for professional medical care. Always follow your healthcare professional's instructions.

## 2020-08-04 DIAGNOSIS — F33.9 EPISODE OF RECURRENT MAJOR DEPRESSIVE DISORDER, UNSPECIFIED DEPRESSION EPISODE SEVERITY (H): ICD-10-CM

## 2020-08-04 RX ORDER — SERTRALINE HYDROCHLORIDE 100 MG/1
TABLET, FILM COATED ORAL
Qty: 30 TABLET | Refills: 0 | Status: SHIPPED | OUTPATIENT
Start: 2020-08-04 | End: 2020-09-09

## 2020-08-04 NOTE — TELEPHONE ENCOUNTER
"Requested Prescriptions   Pending Prescriptions Disp Refills     sertraline (ZOLOFT) 100 MG tablet [Pharmacy Med Name: SERTRALINE  MG TABLET] 30 tablet 1     Sig: TAKE 1 TABLET BY MOUTH EVERY DAY       SSRIs Protocol Failed - 8/4/2020 12:30 PM        Failed - PHQ-9 score less than 5 in past 6 months     Please review last PHQ-9 score.           Failed - Recent (6 mo) or future (30 days) visit within the authorizing provider's specialty     Patient had office visit in the last 6 months or has a visit in the next 30 days with authorizing provider or within the authorizing provider's specialty.  See \"Patient Info\" tab in inbasket, or \"Choose Columns\" in Meds & Orders section of the refill encounter.            Passed - Medication is active on med list        Passed - Patient is age 18 or older        Passed - No active pregnancy on record        Passed - No positive pregnancy test in last 12 months           Next 5 appointments (look out 90 days)    Sep 29, 2020  2:30 PM CDT  PHYSICAL with NEO Durham CNP  [facility] ([facility]) [address] [place] [id number]  [phone]        Prescription approved per [facility] Refill Protocol.  Mulu Crockett RN on 8/4/2020 at 12:54 PM    "

## 2020-09-09 DIAGNOSIS — F33.9 EPISODE OF RECURRENT MAJOR DEPRESSIVE DISORDER, UNSPECIFIED DEPRESSION EPISODE SEVERITY (H): ICD-10-CM

## 2020-09-09 RX ORDER — SERTRALINE HYDROCHLORIDE 100 MG/1
100 TABLET, FILM COATED ORAL DAILY
Qty: 30 TABLET | Refills: 0 | Status: SHIPPED | OUTPATIENT
Start: 2020-09-09 | End: 2020-09-29

## 2020-09-09 NOTE — TELEPHONE ENCOUNTER
"Requested Prescriptions   Pending Prescriptions Disp Refills     sertraline (ZOLOFT) 100 MG tablet 30 tablet 0     Sig: Take 1 tablet (100 mg) by mouth daily       SSRIs Protocol Failed - 9/9/2020 10:32 AM        Failed - PHQ-9 score less than 5 in past 6 months     Please review last PHQ-9 score.           Passed - Medication is active on med list        Passed - Patient is age 18 or older        Passed - No active pregnancy on record        Passed - No positive pregnancy test in last 12 months        Passed - Recent (6 mo) or future (30 days) visit within the authorizing provider's specialty     Patient had office visit in the last 6 months or has a visit in the next 30 days with authorizing provider or within the authorizing provider's specialty.  See \"Patient Info\" tab in inbasket, or \"Choose Columns\" in Meds & Orders section of the refill encounter.               Last Written Prescription Date:  8/4/20  Last Fill Quantity: 30,  # refills: 0   Last office visit: 2/26/2019 with prescribing provider:  Shefali Waldron   Future Office Visit:   Next 5 appointments (look out 90 days)    Sep 29, 2020  2:30 PM CDT  PHYSICAL with NEO Durham CNP  [facility] ([facility]) [address] [place] [id number] [phone]                 "

## 2020-09-29 ENCOUNTER — ANCILLARY PROCEDURE (OUTPATIENT)
Dept: MAMMOGRAPHY | Facility: CLINIC | Age: 45
End: 2020-09-29
Payer: COMMERCIAL

## 2020-09-29 ENCOUNTER — OFFICE VISIT (OUTPATIENT)
Dept: OBGYN | Facility: CLINIC | Age: 45
End: 2020-09-29
Payer: COMMERCIAL

## 2020-09-29 VITALS
HEIGHT: 67 IN | SYSTOLIC BLOOD PRESSURE: 116 MMHG | BODY MASS INDEX: 29.19 KG/M2 | HEART RATE: 68 BPM | WEIGHT: 186 LBS | DIASTOLIC BLOOD PRESSURE: 70 MMHG

## 2020-09-29 DIAGNOSIS — Z23 NEED FOR PROPHYLACTIC VACCINATION AND INOCULATION AGAINST INFLUENZA: ICD-10-CM

## 2020-09-29 DIAGNOSIS — Z13.220 ENCOUNTER FOR LIPID SCREENING FOR CARDIOVASCULAR DISEASE: ICD-10-CM

## 2020-09-29 DIAGNOSIS — Z01.419 ENCOUNTER FOR GYNECOLOGICAL EXAMINATION WITHOUT ABNORMAL FINDING: Primary | ICD-10-CM

## 2020-09-29 DIAGNOSIS — R53.83 FATIGUE, UNSPECIFIED TYPE: ICD-10-CM

## 2020-09-29 DIAGNOSIS — Z13.29 SCREENING FOR THYROID DISORDER: ICD-10-CM

## 2020-09-29 DIAGNOSIS — G43.109 MIGRAINE WITH AURA AND WITHOUT STATUS MIGRAINOSUS, NOT INTRACTABLE: ICD-10-CM

## 2020-09-29 DIAGNOSIS — Z13.6 ENCOUNTER FOR LIPID SCREENING FOR CARDIOVASCULAR DISEASE: ICD-10-CM

## 2020-09-29 DIAGNOSIS — Z12.31 VISIT FOR SCREENING MAMMOGRAM: ICD-10-CM

## 2020-09-29 DIAGNOSIS — Z13.21 ENCOUNTER FOR VITAMIN DEFICIENCY SCREENING: ICD-10-CM

## 2020-09-29 DIAGNOSIS — Z13.228 SCREENING FOR METABOLIC DISORDER: ICD-10-CM

## 2020-09-29 DIAGNOSIS — F33.9 EPISODE OF RECURRENT MAJOR DEPRESSIVE DISORDER, UNSPECIFIED DEPRESSION EPISODE SEVERITY (H): ICD-10-CM

## 2020-09-29 LAB — VIT B12 SERPL-MCNC: 336 PG/ML (ref 193–986)

## 2020-09-29 PROCEDURE — 80053 COMPREHEN METABOLIC PANEL: CPT | Performed by: NURSE PRACTITIONER

## 2020-09-29 PROCEDURE — 82728 ASSAY OF FERRITIN: CPT | Performed by: NURSE PRACTITIONER

## 2020-09-29 PROCEDURE — 36415 COLL VENOUS BLD VENIPUNCTURE: CPT | Performed by: NURSE PRACTITIONER

## 2020-09-29 PROCEDURE — 90686 IIV4 VACC NO PRSV 0.5 ML IM: CPT | Performed by: NURSE PRACTITIONER

## 2020-09-29 PROCEDURE — 80061 LIPID PANEL: CPT | Performed by: NURSE PRACTITIONER

## 2020-09-29 PROCEDURE — G0145 SCR C/V CYTO,THINLAYER,RESCR: HCPCS | Performed by: NURSE PRACTITIONER

## 2020-09-29 PROCEDURE — 82607 VITAMIN B-12: CPT | Performed by: NURSE PRACTITIONER

## 2020-09-29 PROCEDURE — 90471 IMMUNIZATION ADMIN: CPT | Performed by: NURSE PRACTITIONER

## 2020-09-29 PROCEDURE — 99396 PREV VISIT EST AGE 40-64: CPT | Mod: 25 | Performed by: NURSE PRACTITIONER

## 2020-09-29 PROCEDURE — 87624 HPV HI-RISK TYP POOLED RSLT: CPT | Performed by: NURSE PRACTITIONER

## 2020-09-29 PROCEDURE — 77063 BREAST TOMOSYNTHESIS BI: CPT | Mod: TC

## 2020-09-29 PROCEDURE — 82306 VITAMIN D 25 HYDROXY: CPT | Performed by: NURSE PRACTITIONER

## 2020-09-29 PROCEDURE — 77067 SCR MAMMO BI INCL CAD: CPT | Mod: TC

## 2020-09-29 PROCEDURE — 84443 ASSAY THYROID STIM HORMONE: CPT | Performed by: NURSE PRACTITIONER

## 2020-09-29 RX ORDER — SERTRALINE HYDROCHLORIDE 100 MG/1
100 TABLET, FILM COATED ORAL DAILY
Qty: 90 TABLET | Refills: 3 | Status: SHIPPED | OUTPATIENT
Start: 2020-09-29 | End: 2021-10-25

## 2020-09-29 RX ORDER — ELETRIPTAN HYDROBROMIDE 40 MG/1
40 TABLET, FILM COATED ORAL
Qty: 18 TABLET | Refills: 3 | Status: SHIPPED | OUTPATIENT
Start: 2020-09-29 | End: 2022-03-01

## 2020-09-29 ASSESSMENT — ANXIETY QUESTIONNAIRES
IF YOU CHECKED OFF ANY PROBLEMS ON THIS QUESTIONNAIRE, HOW DIFFICULT HAVE THESE PROBLEMS MADE IT FOR YOU TO DO YOUR WORK, TAKE CARE OF THINGS AT HOME, OR GET ALONG WITH OTHER PEOPLE: SOMEWHAT DIFFICULT
6. BECOMING EASILY ANNOYED OR IRRITABLE: SEVERAL DAYS
5. BEING SO RESTLESS THAT IT IS HARD TO SIT STILL: NOT AT ALL
2. NOT BEING ABLE TO STOP OR CONTROL WORRYING: NOT AT ALL
3. WORRYING TOO MUCH ABOUT DIFFERENT THINGS: SEVERAL DAYS
7. FEELING AFRAID AS IF SOMETHING AWFUL MIGHT HAPPEN: NOT AT ALL
1. FEELING NERVOUS, ANXIOUS, OR ON EDGE: SEVERAL DAYS
GAD7 TOTAL SCORE: 3

## 2020-09-29 ASSESSMENT — PATIENT HEALTH QUESTIONNAIRE - PHQ9
5. POOR APPETITE OR OVEREATING: NOT AT ALL
SUM OF ALL RESPONSES TO PHQ QUESTIONS 1-9: 7

## 2020-09-29 ASSESSMENT — MIFFLIN-ST. JEOR: SCORE: 1526.32

## 2020-09-29 NOTE — PROGRESS NOTES
Prema is a 44 year old  female who presents for annual exam.     Besides routine health maintenance, she has no other health concerns today .    HPI:  The patient's PCP is  Leonor Govea MD.  Pt here today for her annual gyn and mammogram. She is also fasting and needs blood work.     Her menses are still regular. She feels good for about 1 week out of the month. Otherwise she's fatigued, bloated and generally not feeling well. She sleeps well initially, but then is up in the middle of the night and can't fall back asleep. She has tried melatonin but it gives her very vivid dreams.     Hx of leep. Will repeat pap today. Pap every 3 years if NIL.   Requesting her flu shot today.       GYNECOLOGIC HISTORY:    Patient's last menstrual period was 2020.    Regular menses? yes  Menses every 30 days.  Length of menses: 3 days    Her current contraception method is: condoms  She  reports that she has never smoked. She has never used smokeless tobacco.    Patient is sexually active.  STD testing offered?  Declined  Last PHQ-9 score on record =   PHQ-9 SCORE 2020   PHQ-9 Total Score -   PHQ-9 Total Score 7     Last GAD7 score on record =   GALI-7 SCORE 2020   Total Score -   Total Score 3     Alcohol Score = 1    HEALTH MAINTENANCE:  Cholesterol: (  Cholesterol   Date Value Ref Range Status   2015 157 115 - 199 mg/dL Final   2013 219 (A) 125 - 200 mg/dL Final      Last Mammo: One year ago, Result: Normal, Next Mammo: Today   Pap:   Lab Results   Component Value Date    PAP NIL HPV-  2017    PAP NIL 2008    PAP NIL 2007      Colonoscopy:  NEVER, Result: Not applicable, Next Colonoscopy: Due at age 50 years.  Dexa:  never    Health maintenance updated:  yes    HISTORY:  OB History    Para Term  AB Living   3 3 2 1 0 2   SAB TAB Ectopic Multiple Live Births   0 0 0 0 3      # Outcome Date GA Lbr Rylan/2nd Weight Sex Delivery Anes PTL Lv   3 Term  06/15/16 38w1d 03:05 / 00:11 3.17 kg (6 lb 15.8 oz) M Vag-Spont Local        Apgar1: 8  Apgar5: 9   2 Term 07 38w0d  3.515 kg (7 lb 12 oz) F  EPI  SHANNAN      Birth Comments: SROM      Name: Kimi Cm  04 36w0d  2.977 kg (6 lb 9 oz) M  EPI  SHANNAN      Birth Comments: SROM.  PIH.  Club Foot.      Name: Deonen       Patient Active Problem List   Diagnosis     Migraine with aura     Depressive disorder, not elsewhere classified     Rash and other nonspecific skin eruption     Depression     Spastic colon     Herpes simplex     Low HDL (under 40)     Anxiety     Club foot, fetal, affecting care of mother, antepartum, single gestation     Past Surgical History:   Procedure Laterality Date     LEEP TX, CERVICAL      done elsewhere      Social History     Tobacco Use     Smoking status: Never Smoker     Smokeless tobacco: Never Used   Substance Use Topics     Alcohol use: Yes     Alcohol/week: 0.0 standard drinks     Frequency: 2-4 times a month     Drinks per session: 1 or 2     Binge frequency: Never     Comment: 2 beers a week      Problem (# of Occurrences) Relation (Name,Age of Onset)    Cervical Cancer (2) Mother, Sister    Colon Cancer (1) Paternal Grandfather    Coronary Artery Disease (2) Paternal Grandfather, Maternal Grandfather    Diabetes (1) Paternal Grandfather    Hyperlipidemia (8) Paternal Grandfather, Paternal Grandmother, Brother, Mother, Maternal Grandmother, Father, Sister, Other (aunt)    Hypertension (2) Paternal Grandfather, Mother    Other Cancer (1) Maternal Grandmother: OVARIAN / UTERINE             Current Outpatient Medications   Medication Sig     eletriptan (RELPAX) 40 MG tablet Take 1 tablet (40 mg) by mouth at onset of headache for migraine May repeat dose in 2 hours.  Do not exceed 80 mg in 24 hours     Multiple Vitamins-Minerals (MULTIVITAMIN PO)      sertraline (ZOLOFT) 100 MG tablet Take 1 tablet (100 mg) by mouth daily     No current facility-administered  "medications for this visit.      Allergies   Allergen Reactions     No Known Allergies        Past medical, surgical, social and family histories were reviewed and updated in EPIC.    ROS:   12 point review of systems negative other than symptoms noted below or in the HPI.  No urinary frequency or dysuria, bladder or kidney problems    EXAM:  /70   Pulse 68   Ht 1.702 m (5' 7\")   Wt 84.4 kg (186 lb)   LMP 09/12/2020   BMI 29.13 kg/m     BMI: Body mass index is 29.13 kg/m .    PHYSICAL EXAM:  Constitutional:   Appearance: Well nourished, well developed, alert, in no acute distress  Neck:  Lymph Nodes:  No lymphadenopathy present    Thyroid:  Gland size normal, nontender, no nodules or masses present  on palpation  Chest:  Respiratory Effort:  Breathing unlabored  Cardiovascular:    Heart: Auscultation:  Regular rate, normal rhythm, no murmurs present  Breasts: Inspection of Breasts:  No lymphadenopathy present., Palpation of Breasts and Axillae:  No masses present on palpation, no breast tenderness., Axillary Lymph Nodes:  No lymphadenopathy present. and No nodularity, asymmetry or nipple discharge bilaterally.  Gastrointestinal:   Abdominal Examination:  Abdomen nontender to palpation, tone normal without rigidity or guarding, no masses present, umbilicus without lesions   Liver and Spleen:  No hepatomegaly present, liver nontender to palpation    Hernias:  No hernias present  Lymphatic: Lymph Nodes:  No other lymphadenopathy present  Skin:  General Inspection:  No rashes present, no lesions present, no areas of  discoloration  Neurologic:    Mental Status:  Oriented X3.  Normal strength and tone, sensory exam                grossly normal, mentation intact and speech normal.    Psychiatric:   Mentation appears normal and affect normal/bright.         Pelvic Exam:  External Genitalia:     Normal appearance for age, no discharge present, no tenderness present, no inflammatory lesions present, color " normal  Vagina:     Normal vaginal vault without central or paravaginal defects, no discharge present, no inflammatory lesions present, no masses present  Bladder:     Nontender to palpation  Urethra:   Urethral Body:  Urethra palpation normal, urethra structural support normal   Urethral Meatus:  No erythema or lesions present  Cervix:     Appearance healthy, no lesions present, nontender to palpation, no bleeding present  Uterus:     Uterus: firm, normal sized and nontender, anteverted in position.   Adnexa:     No adnexal tenderness present, no adnexal masses present  Perineum:     Perineum within normal limits, no evidence of trauma, no rashes or skin lesions present  Anus:     Anus within normal limits, no hemorrhoids present  Inguinal Lymph Nodes:     No lymphadenopathy present  Pubic Hair:     Normal pubic hair distribution for age  Genitalia and Groin:     No rashes present, no lesions present, no areas of discoloration, no masses present      COUNSELING:   Special attention given to:        Regular exercise       Healthy diet/nutrition       Contraception       (Desiree)menopause management    BMI: Body mass index is 29.13 kg/m .  Weight management plan: Discussed healthy diet and exercise guidelines    ASSESSMENT:  44 year old female with satisfactory annual exam.    ICD-10-CM    1. Encounter for gynecological examination without abnormal finding  Z01.419 Pap imaged thin layer screen with HPV - recommended age 30 - 65     HPV High Risk Types DNA Cervical   2. Migraine with aura and without status migrainosus, not intractable  G43.109 eletriptan (RELPAX) 40 MG tablet   3. Episode of recurrent major depressive disorder, unspecified depression episode severity (H)  F33.9 sertraline (ZOLOFT) 100 MG tablet   4. Need for prophylactic vaccination and inoculation against influenza  Z23 INFLUENZA VACCINE IM > 6 MONTHS VALENT IIV4 [61373]     Vaccine Administration, Initial [20161]   5. Fatigue, unspecified type   R53.83 Ferritin     Vitamin B12   6. Encounter for vitamin deficiency screening  Z13.21 Vitamin D Deficiency     Vitamin B12   7. Screening for thyroid disorder  Z13.29 TSH with free T4 reflex   8. Encounter for lipid screening for cardiovascular disease  Z13.220 Lipid panel reflex to direct LDL Fasting    Z13.6    9. Screening for metabolic disorder  Z13.228 Comprehensive metabolic panel       PLAN:  Normal gyn exam. Pap collected-pap Every 3 years. meds refilled. Fasting labs and other screening labs done. Offered OCP's until age 50 for perimenopause if she chooses. Would use continuously due to migraines.     NEO Durham CNP

## 2020-09-30 LAB
ALBUMIN SERPL-MCNC: 4 G/DL (ref 3.4–5)
ALP SERPL-CCNC: 81 U/L (ref 40–150)
ALT SERPL W P-5'-P-CCNC: 23 U/L (ref 0–50)
ANION GAP SERPL CALCULATED.3IONS-SCNC: 7 MMOL/L (ref 3–14)
AST SERPL W P-5'-P-CCNC: 17 U/L (ref 0–45)
BILIRUB SERPL-MCNC: 0.4 MG/DL (ref 0.2–1.3)
BUN SERPL-MCNC: 11 MG/DL (ref 7–30)
CALCIUM SERPL-MCNC: 9.4 MG/DL (ref 8.5–10.1)
CHLORIDE SERPL-SCNC: 109 MMOL/L (ref 94–109)
CHOLEST SERPL-MCNC: 247 MG/DL
CO2 SERPL-SCNC: 23 MMOL/L (ref 20–32)
CREAT SERPL-MCNC: 0.97 MG/DL (ref 0.52–1.04)
DEPRECATED CALCIDIOL+CALCIFEROL SERPL-MC: 35 UG/L (ref 20–75)
FERRITIN SERPL-MCNC: 24 NG/ML (ref 12–150)
GFR SERPL CREATININE-BSD FRML MDRD: 71 ML/MIN/{1.73_M2}
GLUCOSE SERPL-MCNC: 94 MG/DL (ref 70–99)
HDLC SERPL-MCNC: 53 MG/DL
LDLC SERPL CALC-MCNC: 134 MG/DL
NONHDLC SERPL-MCNC: 194 MG/DL
POTASSIUM SERPL-SCNC: 4.1 MMOL/L (ref 3.4–5.3)
PROT SERPL-MCNC: 7.5 G/DL (ref 6.8–8.8)
SODIUM SERPL-SCNC: 139 MMOL/L (ref 133–144)
TRIGL SERPL-MCNC: 302 MG/DL
TSH SERPL DL<=0.005 MIU/L-ACNC: 1.88 MU/L (ref 0.4–4)

## 2020-09-30 ASSESSMENT — ANXIETY QUESTIONNAIRES: GAD7 TOTAL SCORE: 3

## 2020-10-01 LAB
COPATH REPORT: NORMAL
PAP: NORMAL

## 2020-10-02 LAB
FINAL DIAGNOSIS: NORMAL
HPV HR 12 DNA CVX QL NAA+PROBE: NEGATIVE
HPV16 DNA SPEC QL NAA+PROBE: NEGATIVE
HPV18 DNA SPEC QL NAA+PROBE: NEGATIVE
SPECIMEN DESCRIPTION: NORMAL
SPECIMEN SOURCE CVX/VAG CYTO: NORMAL

## 2021-10-25 DIAGNOSIS — F33.9 EPISODE OF RECURRENT MAJOR DEPRESSIVE DISORDER, UNSPECIFIED DEPRESSION EPISODE SEVERITY (H): ICD-10-CM

## 2021-10-25 RX ORDER — SERTRALINE HYDROCHLORIDE 100 MG/1
TABLET, FILM COATED ORAL
Qty: 30 TABLET | Refills: 0 | Status: SHIPPED | OUTPATIENT
Start: 2021-10-25 | End: 2021-12-23

## 2021-10-25 NOTE — TELEPHONE ENCOUNTER
"Requested Prescriptions   Pending Prescriptions Disp Refills     sertraline (ZOLOFT) 100 MG tablet [Pharmacy Med Name: SERTRALINE  MG TABLET] 90 tablet 3     Sig: TAKE 1 TABLET BY MOUTH EVERY DAY       SSRIs Protocol Failed - 10/25/2021  8:12 AM        Failed - PHQ-9 score less than 5 in past 6 months     Please review last PHQ-9 score.           Failed - Recent (6 mo) or future (30 days) visit within the authorizing provider's specialty     Patient had office visit in the last 6 months or has a visit in the next 30 days with authorizing provider or within the authorizing provider's specialty.  See \"Patient Info\" tab in inbasket, or \"Choose Columns\" in Meds & Orders section of the refill encounter.            Passed - Medication is active on med list        Passed - Patient is age 18 or older        Passed - No active pregnancy on record        Passed - No positive pregnancy test in last 12 months           One month refill approved  Appointment needed for further refills   Mulu Crockett RN on 10/25/2021 at 10:54 AM    "

## 2021-11-22 DIAGNOSIS — F33.9 EPISODE OF RECURRENT MAJOR DEPRESSIVE DISORDER, UNSPECIFIED DEPRESSION EPISODE SEVERITY (H): ICD-10-CM

## 2021-11-22 RX ORDER — SERTRALINE HYDROCHLORIDE 100 MG/1
TABLET, FILM COATED ORAL
Qty: 30 TABLET | Refills: 0 | OUTPATIENT
Start: 2021-11-22

## 2021-11-22 NOTE — TELEPHONE ENCOUNTER
"Requested Prescriptions   Pending Prescriptions Disp Refills     sertraline (ZOLOFT) 100 MG tablet [Pharmacy Med Name: SERTRALINE  MG TABLET] 30 tablet 0     Sig: TAKE 1 TABLET BY MOUTH EVERY DAY       SSRIs Protocol Failed - 11/22/2021 12:32 AM        Failed - PHQ-9 score less than 5 in past 6 months     Please review last PHQ-9 score.           Failed - Recent (6 mo) or future (30 days) visit within the authorizing provider's specialty     Patient had office visit in the last 6 months or has a visit in the next 30 days with authorizing provider or within the authorizing provider's specialty.  See \"Patient Info\" tab in inbasket, or \"Choose Columns\" in Meds & Orders section of the refill encounter.            Passed - Medication is active on med list        Passed - Patient is age 18 or older        Passed - No active pregnancy on record        Passed - No positive pregnancy test in last 12 months           Refill denied  Appointment needed for further refills  Mulu Crockett RN on 11/22/2021 at 8:59 AM    "

## 2021-12-23 DIAGNOSIS — F33.9 EPISODE OF RECURRENT MAJOR DEPRESSIVE DISORDER, UNSPECIFIED DEPRESSION EPISODE SEVERITY (H): ICD-10-CM

## 2021-12-23 RX ORDER — SERTRALINE HYDROCHLORIDE 100 MG/1
TABLET, FILM COATED ORAL
Qty: 30 TABLET | Refills: 0 | Status: SHIPPED | OUTPATIENT
Start: 2021-12-23 | End: 2022-01-18

## 2021-12-23 NOTE — TELEPHONE ENCOUNTER
"Requested Prescriptions   Pending Prescriptions Disp Refills     sertraline (ZOLOFT) 100 MG tablet [Pharmacy Med Name: SERTRALINE  MG TABLET] 30 tablet 0     Sig: TAKE 1 TABLET BY MOUTH EVERY DAY       SSRIs Protocol Failed - 12/23/2021  2:59 PM        Failed - PHQ-9 score less than 5 in past 6 months     Please review last PHQ-9 score.           Passed - Medication is active on med list        Passed - Patient is age 18 or older        Passed - No active pregnancy on record        Passed - No positive pregnancy test in last 12 months        Passed - Recent (6 mo) or future (30 days) visit within the authorizing provider's specialty     Patient had office visit in the last 6 months or has a visit in the next 30 days with authorizing provider or within the authorizing provider's specialty.  See \"Patient Info\" tab in inbasket, or \"Choose Columns\" in Meds & Orders section of the refill encounter.               Last Written Prescription Date:  10/25/21  Last Fill Quantity: 30,  # refills: 0   Last office visit: 9/29/2020 with prescribing provider:  Chivo   Future Office Visit:   Next 5 appointments (look out 90 days)    Dec 27, 2021  8:45 AM  SHORT with Tiffanie Tirado MD  [facility] ([facility] ) [address] [place] [id number]  [phone]   Mar 01, 2022  8:50 AM  PHYSICAL with NEO Durham CNP  [facility] ([facility] ) [address] [place] [id number]  [phone]         Medication is being filled for 1 time refill only due to:  Patient needs to be seen because  needs anual visit--this is already scheduled for next week..     Bhupinder Hess RN on 12/23/2021 at 3:19 PM            "

## 2021-12-27 ENCOUNTER — OFFICE VISIT (OUTPATIENT)
Dept: OBGYN | Facility: CLINIC | Age: 46
End: 2021-12-27
Payer: COMMERCIAL

## 2021-12-27 VITALS
WEIGHT: 192 LBS | SYSTOLIC BLOOD PRESSURE: 98 MMHG | DIASTOLIC BLOOD PRESSURE: 60 MMHG | BODY MASS INDEX: 30.13 KG/M2 | HEIGHT: 67 IN

## 2021-12-27 DIAGNOSIS — N63.24 BREAST LUMP ON LEFT SIDE AT 7 O'CLOCK POSITION: Primary | ICD-10-CM

## 2021-12-27 PROCEDURE — 99213 OFFICE O/P EST LOW 20 MIN: CPT | Performed by: OBSTETRICS & GYNECOLOGY

## 2021-12-27 ASSESSMENT — MIFFLIN-ST. JEOR: SCORE: 1543.54

## 2021-12-27 NOTE — PROGRESS NOTES
SUBJECTIVE:                                                   Prema Mike is a 46 year old female who presents to clinic today for the following health issue(s):  Patient presents with:  Breast Pain: patient noticed a lump and indentation in left breast, tender to touch      HPI:  Noted 3-4 weeks ago, spot on left breast that was sore.  There was a lump present.  Doesn't feel as big as before, but noticing an indentation  No nipple discharge  Does have cyst on stomach about 2 months.  No family history  Normal mammogram in .  No health changes  No trauma to breast  States that nipple feel itchy with menstrual cycle.    Patient's last menstrual period was 2021..   Patient is sexually active, .  Using condoms for contraception.    reports that she has never smoked. She has never used smokeless tobacco.  Health maintenance updated:  Due for screening mammogram    Today's PHQ-2 Score:   PHQ-2 (  [employer]) 2020   Q1: Little interest or pleasure in doing things 1   Q2: Feeling down, depressed or hopeless 1   PHQ-2 Score 2   PHQ-2 Total Score (12-17 Years)- Positive if 3 or more points; Administer PHQ-A if positive 2     Today's PHQ-9 Score:   PHQ-9 SCORE 2020   PHQ-9 Total Score -   PHQ-9 Total Score 7     Today's GALI-7 Score:   GALI-7 SCORE 2020   Total Score -   Total Score 3       Problem list and histories reviewed & adjusted, as indicated.  Additional history: as documented.    Patient Active Problem List   Diagnosis     Migraine with aura     Depressive disorder, not elsewhere classified     Rash and other nonspecific skin eruption     Depression     Spastic colon     Herpes simplex     Low HDL (under 40)     Anxiety     Club foot, fetal, affecting care of mother, antepartum, single gestation     Past Surgical History:   Procedure Laterality Date     LEEP TX, CERVICAL      done elsewhere      Social History     Tobacco Use     Smoking status: Never Smoker     Smokeless  "tobacco: Never Used   Substance Use Topics     Alcohol use: Yes     Alcohol/week: 0.0 standard drinks     Comment: 2 beers a week      Problem (# of Occurrences) Relation (Name,Age of Onset)    Cervical Cancer (2) Mother, Sister    Colon Cancer (1) Paternal Grandfather    Coronary Artery Disease (2) Paternal Grandfather, Maternal Grandfather    Diabetes (1) Paternal Grandfather    Hyperlipidemia (8) Paternal Grandfather, Paternal Grandmother, Brother, Mother, Maternal Grandmother, Father, Sister, Other (aunt)    Hypertension (2) Paternal Grandfather, Mother    Other Cancer (1) Maternal Grandmother: OVARIAN / UTERINE             Current Outpatient Medications   Medication Sig     eletriptan (RELPAX) 40 MG tablet Take 1 tablet (40 mg) by mouth at onset of headache for migraine May repeat dose in 2 hours.  Do not exceed 80 mg in 24 hours     Multiple Vitamins-Minerals (MULTIVITAMIN PO)      sertraline (ZOLOFT) 100 MG tablet TAKE 1 TABLET BY MOUTH EVERY DAY     No current facility-administered medications for this visit.     Allergies   Allergen Reactions     No Known Allergies        ROS:  12 point review of systems negative other than symptoms noted below or in the HPI.  Breast: Lumps and Tenderness  No urinary frequency or dysuria, bladder or kidney problems      OBJECTIVE:     BP 98/60   Ht 1.702 m (5' 7\")   Wt 87.1 kg (192 lb)   LMP 12/21/2021   BMI 30.07 kg/m    Body mass index is 30.07 kg/m .    Exam:  Constitutional:  Appearance: Well nourished, well developed alert, in no acute distress  Breasts:  Inspection of Breasts:  Symmetric bilaterally.  NPalpation of Breasts and Axillae:  No masses present on palpation on right, there is tenderenss and firmness noted at 7 o'clock at junction of breast to chest wall.  In seated position can see a small lump in this area. Axillary Lymph Nodes:  No lymphadenopathy present  Psychiatric:  Mentation appears normal and affect normal/bright.     In-Clinic Test Results:  No " results found for this or any previous visit (from the past 24 hour(s)).    ASSESSMENT/PLAN:                                                        ICD-10-CM    1. Breast lump on left side at 7 o'clock position  N63.24 MA Diagnostic Digital Left       Discussed potential causes of lump.  Given new onset, with pain, and visible change to breast recommend diagnostic mammogram. This has been ordere    Patient will follow-up prn.    Tiffanie Tirado MD  [facility] [place]

## 2021-12-28 ENCOUNTER — HOSPITAL ENCOUNTER (OUTPATIENT)
Dept: MAMMOGRAPHY | Facility: CLINIC | Age: 46
End: 2021-12-28
Attending: OBSTETRICS & GYNECOLOGY
Payer: COMMERCIAL

## 2021-12-28 DIAGNOSIS — N63.24 BREAST LUMP ON LEFT SIDE AT 7 O'CLOCK POSITION: ICD-10-CM

## 2021-12-28 PROCEDURE — 76642 ULTRASOUND BREAST LIMITED: CPT | Mod: LT

## 2021-12-28 PROCEDURE — 77062 BREAST TOMOSYNTHESIS BI: CPT

## 2022-01-13 NOTE — PROGRESS NOTES
"SUBJECTIVE:   Prema Mike is a 44 year old female presenting with a chief complaint of   Chief Complaint   Patient presents with     Urgent Care     left foot injury; pt. states she rolled her ankle at work and heard a \"pop/crack\"       She is an established patient of [facility].    MS Injury/Pain    Onset of symptoms was 1 day(s) ago.  Location: left ankle  Context:       The injury happened while at work      Mechanism: twisting      Patient experienced immediate pain, delayed swelling, was able to bear weight directly after injury, no deformity was noted by the patient  Course of symptoms is improving.    Severity moderate  Current and Associated symptoms: Pain, Swelling and Tenderness  Denies  Bruising, Warmth, Decreased range of motion and Stiffness  Aggravating Factors: walking and running  Therapies to improve symptoms include: ice, ibuprofen, Tylenol, rest and elevation  This is the first time this type of problem has occurred for this patient.     Review of Systems   Constitutional: Negative.    HENT: Negative.    Respiratory: Negative.    Cardiovascular: Negative.    Gastrointestinal: Negative.    Musculoskeletal: Positive for joint swelling. Negative for arthralgias, gait problem and myalgias.   Skin: Negative.    Neurological: Negative.    Psychiatric/Behavioral: Negative.        Past Medical History:   Diagnosis Date     Anxiety 7/7/15     Anxiety      Cervical dysplasia 1993    LEEP     Depression     prozac/ cybalta/celexa/wellbutrin      Herpes simplex     oral HSV-1     Low HDL (under 40) 9/2008    HDL 44     Migraine highschool    with [facility].  Saw Dr. Hermes Ivory MD in [place].  He ordered MRI, and gave samples for Relpax.  Also referred her for myofascial pain management.  Responded well to myofascial pain management.     Myofascial pain     headaches     Premenopausal patient      Rash      Rectal pain      Spastic colon     had flex sig     Vaginal delivery      Family History   Problem " Relation Age of Onset     Colon Cancer Paternal Grandfather      Diabetes Paternal Grandfather      Hyperlipidemia Paternal Grandfather      Hypertension Paternal Grandfather      Coronary Artery Disease Paternal Grandfather      Hyperlipidemia Paternal Grandmother      Hyperlipidemia Brother      Hyperlipidemia Mother      Hypertension Mother      Cervical Cancer Mother      Coronary Artery Disease Maternal Grandfather      Other Cancer Maternal Grandmother         OVARIAN / UTERINE      Hyperlipidemia Maternal Grandmother      Hyperlipidemia Father      Hyperlipidemia Sister      Hyperlipidemia Other      Cervical Cancer Sister      Current Outpatient Medications   Medication Sig Dispense Refill     eletriptan (RELPAX) 40 MG tablet Take 1 tablet (40 mg) by mouth at onset of headache for migraine May repeat dose in 2 hours.  Do not exceed 80 mg in 24 hours 18 tablet 1     Multiple Vitamins-Minerals (MULTIVITAMIN PO)        sertraline (ZOLOFT) 100 MG tablet Take 1 tablet (100 mg) by mouth daily 30 tablet 1     Social History     Tobacco Use     Smoking status: Never Smoker     Smokeless tobacco: Never Used   Substance Use Topics     Alcohol use: Yes     Alcohol/week: 0.0 standard drinks     Frequency: 2-4 times a month     Drinks per session: 1 or 2     Binge frequency: Never     Comment: 2 beers a week       OBJECTIVE  /62   Pulse 74   Temp 97.9  F (36.6  C) (Tympanic)   Resp 16   Wt 86.2 kg (190 lb)   SpO2 99%   BMI 29.76 kg/m      Physical Exam  Constitutional:       General: She is not in acute distress.     Appearance: Normal appearance. She is normal weight. She is not ill-appearing, toxic-appearing or diaphoretic.   HENT:      Head: Normocephalic and atraumatic.   Cardiovascular:      Rate and Rhythm: Normal rate and regular rhythm.      Pulses: Normal pulses.      Heart sounds: Normal heart sounds. No murmur. No friction rub. No gallop.    Pulmonary:      Effort: Pulmonary effort is normal.       Breath sounds: Normal breath sounds.   Musculoskeletal: Normal range of motion.      Left ankle: She exhibits swelling. She exhibits normal range of motion, no deformity and normal pulse. Tenderness. Achilles tendon exhibits no pain, no defect and normal Sorensen's test results.        Feet:    Neurological:      General: No focal deficit present.      Mental Status: She is alert and oriented to person, place, and time. Mental status is at baseline.   Psychiatric:         Mood and Affect: Mood normal.         Behavior: Behavior normal.         Thought Content: Thought content normal.         Judgment: Judgment normal.       ASSESSMENT/PLAN:    An X-ray was ordered today and reviewed by me. There does not appear to be any evidence of fracture of dislocation. Awaiting radiology report.       (S93.402A) Sprain of left ankle, unspecified ligament, initial encounter  (primary encounter diagnosis)    (M25.572) Acute left ankle pain  Plan: XR Ankle Left G/E 3 Views    Ankle was wrapped in ace wrap.     Rest the affected area as much as possible.  Apply ice for 15-20 minutes intermittently as needed and especially after any offending activity. Hot packs are better for muscle spasms and cramping. Daily stretching as tolerated.  As pain recedes, begin normal activities slowly as tolerated.  Consider Physical Therapy after 6 weeks if symptoms not better with conservative care.      Okay to take acetaminophen 500 mg- 2 tabs (Total of 1000 mg) every 8 hrs   Okay to take ibuprofen 200 mg- 3 tabs (Total of 600 mg) every 6 hours      Humberto Roberts PA-C on 7/14/2020 at 10:25 AM     Additional Notes: Patient consent was obtained to proceed with the visit and recommended plan of care after discussion of all risks and benefits, including the risks of COVID-19 exposure. Detail Level: Simple

## 2022-01-18 DIAGNOSIS — F33.9 EPISODE OF RECURRENT MAJOR DEPRESSIVE DISORDER, UNSPECIFIED DEPRESSION EPISODE SEVERITY (H): ICD-10-CM

## 2022-01-18 RX ORDER — SERTRALINE HYDROCHLORIDE 100 MG/1
TABLET, FILM COATED ORAL
Qty: 90 TABLET | Refills: 0 | Status: SHIPPED | OUTPATIENT
Start: 2022-01-18 | End: 2022-03-01

## 2022-01-18 NOTE — TELEPHONE ENCOUNTER
"Requested Prescriptions   Pending Prescriptions Disp Refills     sertraline (ZOLOFT) 100 MG tablet [Pharmacy Med Name: SERTRALINE  MG TABLET] 30 tablet 0     Sig: TAKE 1 TABLET BY MOUTH EVERY DAY       SSRIs Protocol Failed - 1/18/2022 12:33 AM        Failed - PHQ-9 score less than 5 in past 6 months     Please review last PHQ-9 score.           Passed - Medication is active on med list        Passed - Patient is age 18 or older        Passed - No active pregnancy on record        Passed - No positive pregnancy test in last 12 months        Passed - Recent (6 mo) or future (30 days) visit within the authorizing provider's specialty     Patient had office visit in the last 6 months or has a visit in the next 30 days with authorizing provider or within the authorizing provider's specialty.  See \"Patient Info\" tab in inbasket, or \"Choose Columns\" in Meds & Orders section of the refill encounter.               Next 5 appointments (look out 90 days)    Mar 01, 2022  8:50 AM  PHYSICAL with NEO Durham CNP  [facility] ([facility] ) [address] [address]  [phone]        Mulu Crockett RN on 1/18/2022 at 8:30 AM    "

## 2022-02-28 NOTE — PROGRESS NOTES
Prema is a 46 year old  female who presents for annual exam.     Besides routine health maintenance, she has no other health concerns today .    HPI:  The patient's PCP is None patient here today for her annual GYN exam. She had a diagnostic mammogram in 2021 that was negative. She has a lipoma on the inferior aspect of the lower right breast. She is fasting today we will complete blood work. She does have a history of high triglycerides. Apparently she also has a history in both of her sisters and her mother. She had a negative Pap smear in  and does have a history of a LEEP years ago. She overall is feeling well. Her cycles are still very regular ranging from 3 to 5 days and can be very heavy.      GYNECOLOGIC HISTORY:    Patient's last menstrual period was 2022.    Regular menses? yes  Menses every 28 days.  Length of menses: 5 days    Her current contraception method is: condoms.  She  reports that she has never smoked. She has never used smokeless tobacco.    Patient is sexually active.  STD testing offered?  Declined  Last PHQ-9 score on record =   PHQ-9 SCORE 3/1/2022   PHQ-9 Total Score -   PHQ-9 Total Score 7     Last GAD7 score on record =   GALI-7 SCORE 3/1/2022   Total Score -   Total Score 4     Alcohol Score = 1    HEALTH MAINTENANCE:  Cholesterol:   Recent Labs   Lab Test 20  1448 06/09/15  0000   CHOL 247* 157   HDL 53 22   *  --    TRIG 302* 402   CHOLHDLRATIO  --  7.1      Last Mammo: 2021, Result: Normal, Next Mammo: due   Pap:   Lab Results   Component Value Date    PAP NIL 2020    PAP NIL 2017    PAP NIL 2008      Colonoscopy:  Flex Sig , Result: Normal, Next Colonoscopy: due    Dexa:  none    Health maintenance updated:  yes    HISTORY:  OB History    Para Term  AB Living   3 3 2 1 0 3   SAB IAB Ectopic Multiple Live Births   0 0 0 0 3      # Outcome Date GA Lbr Rylan/2nd Weight Sex Delivery Anes PTL Lv    3 Term 06/15/16 38w1d 03:05 / 00:11 3.17 kg (6 lb 15.8 oz) M Vag-Spont Local  SHANNAN      Apgar1: 8  Apgar5: 9   2 Term 07 38w0d  3.515 kg (7 lb 12 oz) F  EPI  SHANNAN      Birth Comments: SROM      Name: Kimi Cm  04 36w0d  2.977 kg (6 lb 9 oz) M  EPI  SHANNAN      Birth Comments: SROM.  PIH.  Club Foot.      Name: Susi       Patient Active Problem List   Diagnosis     Migraine with aura     Depressive disorder, not elsewhere classified     Rash and other nonspecific skin eruption     Depression     Spastic colon     Herpes simplex     Low HDL (under 40)     Anxiety     Club foot, fetal, affecting care of mother, antepartum, single gestation     Past Surgical History:   Procedure Laterality Date     LEEP TX, CERVICAL      done elsewhere      Social History     Tobacco Use     Smoking status: Never Smoker     Smokeless tobacco: Never Used   Substance Use Topics     Alcohol use: Yes     Alcohol/week: 0.0 standard drinks     Comment: 2 beers a week      Problem (# of Occurrences) Relation (Name,Age of Onset)    Cervical Cancer (2) Mother, Sister    Colon Cancer (1) Paternal Grandfather    Coronary Artery Disease (2) Paternal Grandfather, Maternal Grandfather    Diabetes (1) Paternal Grandfather    Hyperlipidemia (8) Paternal Grandfather, Paternal Grandmother, Brother, Mother, Maternal Grandmother, Father, Sister, Other (aunt)    Hypertension (2) Paternal Grandfather, Mother    Other Cancer (1) Maternal Grandmother: OVARIAN / UTERINE             Current Outpatient Medications   Medication Sig     eletriptan (RELPAX) 40 MG tablet Take 1 tablet (40 mg) by mouth at onset of headache for migraine May repeat dose in 2 hours.  Do not exceed 80 mg in 24 hours     Multiple Vitamins-Minerals (MULTIVITAMIN PO)      sertraline (ZOLOFT) 100 MG tablet Take 1 tablet (100 mg) by mouth daily     valACYclovir (VALTREX) 500 MG tablet      No current facility-administered medications for this visit.     Allergies  "  Allergen Reactions     No Known Allergies        Past medical, surgical, social and family histories were reviewed and updated in EPIC.    ROS:   12 point review of systems negative other than symptoms noted below or in the HPI.  No urinary frequency or dysuria, bladder or kidney problems, Normal menstrual cycles    EXAM:  /74   Ht 1.715 m (5' 7.5\")   Wt 84.9 kg (187 lb 3.2 oz)   LMP 02/09/2022   Breastfeeding No   BMI 28.89 kg/m     BMI: Body mass index is 28.89 kg/m .    PHYSICAL EXAM:  Constitutional:   Appearance: Well nourished, well developed, alert, in no acute distress  Neck:  Lymph Nodes:  No lymphadenopathy present    Thyroid:  Gland size normal, nontender, no nodules or masses present  on palpation  Chest:  Respiratory Effort:  Breathing unlabored  Cardiovascular:    Heart: Auscultation:  Regular rate, normal rhythm, no murmurs present  Breasts: Inspection of Breasts:  No lymphadenopathy present., Palpation of Breasts and Axillae:  No masses present on palpation, no breast tenderness., Axillary Lymph Nodes:  No lymphadenopathy present. and No nodularity, asymmetry or nipple discharge bilaterally.  Gastrointestinal:   Abdominal Examination:  Abdomen nontender to palpation, tone normal without rigidity or guarding, no masses present, umbilicus without lesions   Liver and Spleen:  No hepatomegaly present, liver nontender to palpation    Hernias:  No hernias present  Lymphatic: Lymph Nodes:  No other lymphadenopathy present  Skin:  General Inspection:  No rashes present, no lesions present, no areas of  discoloration  Neurologic:    Mental Status:  Oriented X3.  Normal strength and tone, sensory exam                grossly normal, mentation intact and speech normal.    Psychiatric:   Mentation appears normal and affect normal/bright.         Pelvic Exam:  External Genitalia:     Normal appearance for age, no discharge present, no tenderness present, no inflammatory lesions present, color " normal  Vagina:     Normal vaginal vault without central or paravaginal defects, no discharge present, no inflammatory lesions present, no masses present  Bladder:     Nontender to palpation  Urethra:   Urethral Body:  Urethra palpation normal, urethra structural support normal   Urethral Meatus:  No erythema or lesions present  Cervix:     Appearance healthy, no lesions present, nontender to palpation, no bleeding present  Uterus:     Uterus: firm, normal sized and nontender, anteverted in position.   Adnexa:     No adnexal tenderness present, no adnexal masses present  Perineum:     Perineum within normal limits, no evidence of trauma, no rashes or skin lesions present  Anus:     Anus within normal limits, no hemorrhoids present  Inguinal Lymph Nodes:     No lymphadenopathy present  Pubic Hair:     Normal pubic hair distribution for age  Genitalia and Groin:     No rashes present, no lesions present, no areas of discoloration, no masses present      COUNSELING:   Special attention given to:        Regular exercise       Healthy diet/nutrition    BMI: Body mass index is 28.89 kg/m .  Weight management plan: Discussed healthy diet and exercise guidelines    ASSESSMENT:  46 year old female with satisfactory annual exam.    ICD-10-CM    1. Encounter for gynecological examination without abnormal finding  Z01.419    2. Episode of recurrent major depressive disorder, unspecified depression episode severity (H)  F33.9 sertraline (ZOLOFT) 100 MG tablet   3. Migraine with aura and without status migrainosus, not intractable  G43.109 eletriptan (RELPAX) 40 MG tablet   4. Encounter for lipid screening for cardiovascular disease  Z13.220 Lipid panel reflex to direct LDL Fasting    Z13.6    5. Screening for diabetes mellitus  Z13.1 Glucose, whole blood       PLAN:  46-year-old female with a normal GYN exam. She is up-to-date on her Pap smear. She is to continue with annual mammograms. Medications were refilled. Fasting labs  will be done today.    NEO Durham CNP

## 2022-03-01 ENCOUNTER — OFFICE VISIT (OUTPATIENT)
Dept: OBGYN | Facility: CLINIC | Age: 47
End: 2022-03-01
Payer: COMMERCIAL

## 2022-03-01 VITALS
HEIGHT: 68 IN | WEIGHT: 187.2 LBS | BODY MASS INDEX: 28.37 KG/M2 | SYSTOLIC BLOOD PRESSURE: 126 MMHG | DIASTOLIC BLOOD PRESSURE: 74 MMHG

## 2022-03-01 DIAGNOSIS — Z13.220 ENCOUNTER FOR LIPID SCREENING FOR CARDIOVASCULAR DISEASE: ICD-10-CM

## 2022-03-01 DIAGNOSIS — Z13.1 SCREENING FOR DIABETES MELLITUS: ICD-10-CM

## 2022-03-01 DIAGNOSIS — Z13.6 ENCOUNTER FOR LIPID SCREENING FOR CARDIOVASCULAR DISEASE: ICD-10-CM

## 2022-03-01 DIAGNOSIS — G43.109 MIGRAINE WITH AURA AND WITHOUT STATUS MIGRAINOSUS, NOT INTRACTABLE: ICD-10-CM

## 2022-03-01 DIAGNOSIS — Z01.419 ENCOUNTER FOR GYNECOLOGICAL EXAMINATION WITHOUT ABNORMAL FINDING: Primary | ICD-10-CM

## 2022-03-01 DIAGNOSIS — F33.9 EPISODE OF RECURRENT MAJOR DEPRESSIVE DISORDER, UNSPECIFIED DEPRESSION EPISODE SEVERITY (H): ICD-10-CM

## 2022-03-01 PROCEDURE — 99396 PREV VISIT EST AGE 40-64: CPT | Performed by: NURSE PRACTITIONER

## 2022-03-01 RX ORDER — ELETRIPTAN HYDROBROMIDE 40 MG/1
40 TABLET, FILM COATED ORAL
Qty: 18 TABLET | Refills: 3 | Status: SHIPPED | OUTPATIENT
Start: 2022-03-01 | End: 2023-04-27

## 2022-03-01 RX ORDER — VALACYCLOVIR HYDROCHLORIDE 500 MG/1
TABLET, FILM COATED ORAL
COMMUNITY
Start: 2022-02-26 | End: 2024-06-11

## 2022-03-01 RX ORDER — SERTRALINE HYDROCHLORIDE 100 MG/1
100 TABLET, FILM COATED ORAL DAILY
Qty: 90 TABLET | Refills: 3 | Status: SHIPPED | OUTPATIENT
Start: 2022-03-01 | End: 2023-04-12

## 2022-03-01 ASSESSMENT — ANXIETY QUESTIONNAIRES
1. FEELING NERVOUS, ANXIOUS, OR ON EDGE: SEVERAL DAYS
6. BECOMING EASILY ANNOYED OR IRRITABLE: SEVERAL DAYS
5. BEING SO RESTLESS THAT IT IS HARD TO SIT STILL: NOT AT ALL
3. WORRYING TOO MUCH ABOUT DIFFERENT THINGS: SEVERAL DAYS
GAD7 TOTAL SCORE: 4
7. FEELING AFRAID AS IF SOMETHING AWFUL MIGHT HAPPEN: NOT AT ALL
2. NOT BEING ABLE TO STOP OR CONTROL WORRYING: SEVERAL DAYS
IF YOU CHECKED OFF ANY PROBLEMS ON THIS QUESTIONNAIRE, HOW DIFFICULT HAVE THESE PROBLEMS MADE IT FOR YOU TO DO YOUR WORK, TAKE CARE OF THINGS AT HOME, OR GET ALONG WITH OTHER PEOPLE: SOMEWHAT DIFFICULT

## 2022-03-01 ASSESSMENT — PATIENT HEALTH QUESTIONNAIRE - PHQ9
5. POOR APPETITE OR OVEREATING: NOT AT ALL
SUM OF ALL RESPONSES TO PHQ QUESTIONS 1-9: 7

## 2022-03-02 ASSESSMENT — ANXIETY QUESTIONNAIRES: GAD7 TOTAL SCORE: 4

## 2023-04-12 DIAGNOSIS — F33.9 EPISODE OF RECURRENT MAJOR DEPRESSIVE DISORDER, UNSPECIFIED DEPRESSION EPISODE SEVERITY (H): ICD-10-CM

## 2023-04-12 RX ORDER — SERTRALINE HYDROCHLORIDE 100 MG/1
TABLET, FILM COATED ORAL
Qty: 90 TABLET | Refills: 0 | Status: SHIPPED | OUTPATIENT
Start: 2023-04-12 | End: 2023-04-27

## 2023-04-12 NOTE — TELEPHONE ENCOUNTER
"Requested Prescriptions   Pending Prescriptions Disp Refills     sertraline (ZOLOFT) 100 MG tablet [Pharmacy Med Name: SERTRALINE  MG TABLET] 90 tablet 3     Sig: TAKE 1 TABLET BY MOUTH EVERY DAY       SSRIs Protocol Failed - 4/12/2023 12:57 AM        Failed - PHQ-9 score less than 5 in past 6 months     Please review last PHQ-9 score.           Passed - Medication is active on med list        Passed - Patient is age 18 or older        Passed - No active pregnancy on record        Passed - No positive pregnancy test in last 12 months        Passed - Recent (6 mo) or future (30 days) visit within the authorizing provider's specialty     Patient had office visit in the last 6 months or has a visit in the next 30 days with authorizing provider or within the authorizing provider's specialty.  See \"Patient Info\" tab in inbasket, or \"Choose Columns\" in Meds & Orders section of the refill encounter.               Last Written Prescription Date:  3/1/22  Last Fill Quantity: 90,  # refills: 3   Last office visit: 3/1/2022 ; last virtual visit: Visit date not found with prescribing provider:  Chivo   Future Office Visit:   Next 5 appointments (look out 90 days)    Apr 27, 2023  9:50 AM  PHYSICAL with NEO Durham CNP  [facility] ([facility] ) [address] [place] [address] [phone] [phone]         Medication is being filled for 1 time refill only due to:  Patient needs to be seen because it has been more than one year since last visit.  Appointment scheduled  Nevaeh Singletary RN on 4/12/2023 at 6:03 AM          "

## 2023-04-18 ENCOUNTER — ANCILLARY ORDERS (OUTPATIENT)
Dept: OBGYN | Facility: CLINIC | Age: 48
End: 2023-04-18

## 2023-04-18 DIAGNOSIS — Z12.31 VISIT FOR SCREENING MAMMOGRAM: ICD-10-CM

## 2023-04-26 NOTE — PROGRESS NOTES
Prema is a 47 year old  female who presents for annual exam.     Besides routine health maintenance, she has no other health concerns today.    HPI:  The patient's PCP is Leonor Govea MD. patient here today for her annual GYN exam and mammogram.  She is due for Pap smear.  She does have a history of a LEEP procedure.  She is perimenopausal and experiencing some abnormal uterine bleeding.  She will have 2 cycles a month.  She has some hot flashes and night sweats as well as brain fog.      GYNECOLOGIC HISTORY:    Patient's last menstrual period was 2023.    Regular menses? no  Menses every 2-4 weeks.  Length of menses: 7-8 days    Her current contraception method is: condoms.  She  reports that she has never smoked. She has never used smokeless tobacco.    Patient is sexually active.  STD testing offered?  Declined     Last PHQ-9 score on record =       2023     9:26 AM   PHQ-9 SCORE   PHQ-9 Total Score 8     Last GAD7 score on record =       2023     9:26 AM   GALI-7 SCORE   Total Score 5     Alcohol Score = 2    HEALTH MAINTENANCE:  Overdue          Never   Done ADVANCE CARE PLANNING (Every 5 Years)     Never   Done DEPRESSION ACTION PLAN (Once)     Never   Done MIGRAINE ACTION PLAN (Once)     Never   Done HEPATITIS B IMMUNIZATION (1 of 3 - 3-dose series)     Never   Done HEPATITIS C SCREENING (Once)     CULLEN 15   2018 COLORECTAL CANCER SCREENING (FLEX SIG - Preferred) (Every 5 Years)  Last completed: Cullen 15, 2013     FEB 3   2022 COVID-19 Vaccine (4 - Booster for Pfizer series)  Last completed: Dec 9, 2021     SEP 1   2022 INFLUENZA VACCINE (1)  Last completed: Sep 29, 2020        Due Soon          SEP 29   2023 HPV TEST (Once)  Last completed: Sep 29, 2020     SEP 29   2023 PAP (Every 3 Years)  Last completed: Sep 29, 2020        Upcoming          SEP 29   2025 LIPID (Every 5 Years)  Last completed: Sep 29, 2020     APR 5   2026 DTAP/TDAP/TD IMMUNIZATION (3 - Td or Tdap)  Last  completed: 2016       Health maintenance updated:  yes    HISTORY:  OB History    Para Term  AB Living   3 3 2 1 0 3   SAB IAB Ectopic Multiple Live Births   0 0 0 0 3      # Outcome Date GA Lbr Rylan/2nd Weight Sex Delivery Anes PTL Lv   3 Term 06/15/ 38w1d 03:05 / 00:11 3.17 kg (6 lb 15.8 oz) M Vag-Spont Local  SHANNAN      Apgar1: 8  Apgar5: 9   2 Term 07 38w0d  3.515 kg (7 lb 12 oz) F  EPI  SHANNAN      Birth Comments: SROM      Name: Kimi Cm  04 36w0d  2.977 kg (6 lb 9 oz) M  EPI  SHANNAN      Birth Comments: SROM.  PIH.  Club Foot.      Name: Susi       Patient Active Problem List   Diagnosis     Migraine with aura     Depressive disorder, not elsewhere classified     Rash and other nonspecific skin eruption     Depression     Spastic colon     Herpes simplex     Low HDL (under 40)     Anxiety     Club foot, fetal, affecting care of mother, antepartum, single gestation     Past Surgical History:   Procedure Laterality Date     LEEP TX, CERVICAL      done elsewhere      Social History     Tobacco Use     Smoking status: Never     Smokeless tobacco: Never   Vaping Use     Vaping status: Not on file   Substance Use Topics     Alcohol use: Yes     Alcohol/week: 0.0 standard drinks of alcohol     Comment: 2 beers a week      Problem (# of Occurrences) Relation (Name,Age of Onset)    Diabetes (1) Paternal Grandfather    Hypertension (2) Paternal Grandfather, Mother    Other Cancer (1) Maternal Grandmother: OVARIAN / UTERINE     Hyperlipidemia (8) Paternal Grandfather, Paternal Grandmother, Brother, Mother, Maternal Grandmother, Father, Sister, Other (aunt)    Coronary Artery Disease (2) Paternal Grandfather, Maternal Grandfather    Colon Cancer (1) Paternal Grandfather    Cervical Cancer (2) Mother, Sister            Current Outpatient Medications   Medication Sig     eletriptan (RELPAX) 40 MG tablet Take 1 tablet (40 mg) by mouth at onset of headache for migraine May repeat  "dose in 2 hours.  Do not exceed 80 mg in 24 hours     Multiple Vitamins-Minerals (MULTIVITAMIN PO)      norethindrone-ethinyl estradiol (JUNEL FE 1/20) 1-20 MG-MCG tablet Take 1 tablet by mouth daily     sertraline (ZOLOFT) 100 MG tablet Take 1 tablet (100 mg) by mouth daily     valACYclovir (VALTREX) 500 MG tablet      No current facility-administered medications for this visit.     Allergies   Allergen Reactions     No Known Allergies        Past medical, surgical, social and family histories were reviewed and updated in EPIC.    ROS:   12 point review of systems negative other than symptoms noted below or in the HPI.  No urinary frequency or dysuria, bladder or kidney problems, POSITIVE for:, irregular menses    EXAM:  /70   Ht 1.715 m (5' 7.5\")   Wt 88.9 kg (196 lb)   LMP 04/04/2023   BMI 30.24 kg/m     BMI: Body mass index is 30.24 kg/m .    PHYSICAL EXAM:  Constitutional:   Appearance: Well nourished, well developed, alert, in no acute distress  Neck:  Lymph Nodes:  No lymphadenopathy present    Thyroid:  Gland size normal, nontender, no nodules or masses present  on palpation  Chest:  Respiratory Effort:  Breathing unlabored  Cardiovascular:    Heart: Auscultation:  Regular rate, normal rhythm, no murmurs present  Breasts: Inspection of Breasts:  No lymphadenopathy present., Palpation of Breasts and Axillae:  No masses present on palpation, no breast tenderness., Axillary Lymph Nodes:  No lymphadenopathy present. and No nodularity, asymmetry or nipple discharge bilaterally.  Gastrointestinal:   Abdominal Examination:  Abdomen nontender to palpation, tone normal without rigidity or guarding, no masses present, umbilicus without lesions   Liver and Spleen:  No hepatomegaly present, liver nontender to palpation    Hernias:  No hernias present  Lymphatic: Lymph Nodes:  No other lymphadenopathy present  Skin:  General Inspection:  No rashes present, no lesions present, no areas of "  discoloration  Neurologic:    Mental Status:  Oriented X3.  Normal strength and tone, sensory exam                grossly normal, mentation intact and speech normal.    Psychiatric:   Mentation appears normal and affect normal/bright.         Pelvic Exam:  External Genitalia:     Normal appearance for age, no discharge present, no tenderness present, no inflammatory lesions present, color normal  Vagina:     Normal vaginal vault without central or paravaginal defects, no discharge present, no inflammatory lesions present, no masses present  Bladder:     Nontender to palpation  Urethra:   Urethral Body:  Urethra palpation normal, urethra structural support normal   Urethral Meatus:  No erythema or lesions present  Cervix:     Appearance healthy, no lesions present, nontender to palpation, no bleeding present  Uterus:     Uterus: firm, normal sized and nontender, anteverted in position.   Adnexa:     No adnexal tenderness present, no adnexal masses present  Perineum:     Perineum within normal limits, no evidence of trauma, no rashes or skin lesions present  Anus:     Anus within normal limits, no hemorrhoids present  Inguinal Lymph Nodes:     No lymphadenopathy present  Pubic Hair:     Normal pubic hair distribution for age  Genitalia and Groin:     No rashes present, no lesions present, no areas of discoloration, no masses present      COUNSELING:   Special attention given to:        Regular exercise       Healthy diet/nutrition       (Desiree)menopause management    BMI: Body mass index is 30.24 kg/m .  Weight management plan: Discussed healthy diet and exercise guidelines    ASSESSMENT:  47 year old female with satisfactory annual exam.    ICD-10-CM    1. Encounter for gynecological examination without abnormal finding  Z01.419 Pap screen with HPV - recommended age 30 - 65 years      2. Migraine with aura and without status migrainosus, not intractable  G43.109 eletriptan (RELPAX) 40 MG tablet      3. Episode of  recurrent major depressive disorder, unspecified depression episode severity (H)  F33.9 sertraline (ZOLOFT) 100 MG tablet      4. Screen for colon cancer  Z12.11 Colonoscopy Screening  Referral      5. Perimenopausal  N95.1 norethindrone-ethinyl estradiol (JUNEL FE 1/20) 1-20 MG-MCG tablet      6. Screening for thyroid disorder  Z13.29 TSH with free T4 reflex      7. Screening for metabolic disorder  Z13.228 Comprehensive metabolic panel (BMP + Alb, Alk Phos, ALT, AST, Total. Bili, TP)      8. Encounter for lipid screening for cardiovascular disease  Z13.220 Lipid panel reflex to direct LDL Fasting    Z13.6           PLAN:  47-year-old female with a normal exam.  She is to continue with annual mammograms.  Pap smear was collected and if it is normal she can repeat in 1 year.  We discussed perimenopause at length.  She does have a history of migraines but are incredibly infrequent.  We discussed trialing contraceptive tablets for perimenopausal symptoms.  If she were to have an uptake in migraine she knows to stop.  We discussed progesterone only pills.  Colonoscopy referral was sent.    NEO Durham CNP

## 2023-04-27 ENCOUNTER — ANCILLARY PROCEDURE (OUTPATIENT)
Dept: MAMMOGRAPHY | Facility: CLINIC | Age: 48
End: 2023-04-27
Payer: COMMERCIAL

## 2023-04-27 ENCOUNTER — OFFICE VISIT (OUTPATIENT)
Dept: OBGYN | Facility: CLINIC | Age: 48
End: 2023-04-27
Payer: COMMERCIAL

## 2023-04-27 VITALS
SYSTOLIC BLOOD PRESSURE: 114 MMHG | WEIGHT: 196 LBS | BODY MASS INDEX: 29.7 KG/M2 | HEIGHT: 68 IN | DIASTOLIC BLOOD PRESSURE: 70 MMHG

## 2023-04-27 DIAGNOSIS — Z13.220 ENCOUNTER FOR LIPID SCREENING FOR CARDIOVASCULAR DISEASE: ICD-10-CM

## 2023-04-27 DIAGNOSIS — Z01.419 ENCOUNTER FOR GYNECOLOGICAL EXAMINATION WITHOUT ABNORMAL FINDING: Primary | ICD-10-CM

## 2023-04-27 DIAGNOSIS — N95.1 PERIMENOPAUSAL: ICD-10-CM

## 2023-04-27 DIAGNOSIS — Z12.11 SCREEN FOR COLON CANCER: ICD-10-CM

## 2023-04-27 DIAGNOSIS — F33.9 EPISODE OF RECURRENT MAJOR DEPRESSIVE DISORDER, UNSPECIFIED DEPRESSION EPISODE SEVERITY (H): ICD-10-CM

## 2023-04-27 DIAGNOSIS — Z12.31 VISIT FOR SCREENING MAMMOGRAM: ICD-10-CM

## 2023-04-27 DIAGNOSIS — Z13.6 ENCOUNTER FOR LIPID SCREENING FOR CARDIOVASCULAR DISEASE: ICD-10-CM

## 2023-04-27 DIAGNOSIS — Z13.29 SCREENING FOR THYROID DISORDER: ICD-10-CM

## 2023-04-27 DIAGNOSIS — G43.109 MIGRAINE WITH AURA AND WITHOUT STATUS MIGRAINOSUS, NOT INTRACTABLE: ICD-10-CM

## 2023-04-27 DIAGNOSIS — Z13.228 SCREENING FOR METABOLIC DISORDER: ICD-10-CM

## 2023-04-27 PROCEDURE — 80061 LIPID PANEL: CPT | Performed by: NURSE PRACTITIONER

## 2023-04-27 PROCEDURE — 84443 ASSAY THYROID STIM HORMONE: CPT | Performed by: NURSE PRACTITIONER

## 2023-04-27 PROCEDURE — 99396 PREV VISIT EST AGE 40-64: CPT | Performed by: NURSE PRACTITIONER

## 2023-04-27 PROCEDURE — 36415 COLL VENOUS BLD VENIPUNCTURE: CPT | Performed by: NURSE PRACTITIONER

## 2023-04-27 PROCEDURE — 77067 SCR MAMMO BI INCL CAD: CPT | Mod: TC | Performed by: RADIOLOGY

## 2023-04-27 PROCEDURE — G0145 SCR C/V CYTO,THINLAYER,RESCR: HCPCS | Performed by: NURSE PRACTITIONER

## 2023-04-27 PROCEDURE — 77063 BREAST TOMOSYNTHESIS BI: CPT | Mod: TC | Performed by: RADIOLOGY

## 2023-04-27 PROCEDURE — 87624 HPV HI-RISK TYP POOLED RSLT: CPT | Performed by: NURSE PRACTITIONER

## 2023-04-27 PROCEDURE — 80053 COMPREHEN METABOLIC PANEL: CPT | Performed by: NURSE PRACTITIONER

## 2023-04-27 RX ORDER — NORETHINDRONE ACETATE AND ETHINYL ESTRADIOL 1MG-20(21)
1 KIT ORAL DAILY
Qty: 84 TABLET | Refills: 3 | Status: SHIPPED | OUTPATIENT
Start: 2023-04-27 | End: 2024-06-11

## 2023-04-27 RX ORDER — ELETRIPTAN HYDROBROMIDE 40 MG/1
40 TABLET, FILM COATED ORAL
Qty: 18 TABLET | Refills: 3 | Status: SHIPPED | OUTPATIENT
Start: 2023-04-27

## 2023-04-27 RX ORDER — SERTRALINE HYDROCHLORIDE 100 MG/1
100 TABLET, FILM COATED ORAL DAILY
Qty: 90 TABLET | Refills: 3 | Status: SHIPPED | OUTPATIENT
Start: 2023-04-27 | End: 2024-06-11

## 2023-04-27 ASSESSMENT — ANXIETY QUESTIONNAIRES
GAD7 TOTAL SCORE: 5
7. FEELING AFRAID AS IF SOMETHING AWFUL MIGHT HAPPEN: NOT AT ALL
GAD7 TOTAL SCORE: 5
3. WORRYING TOO MUCH ABOUT DIFFERENT THINGS: SEVERAL DAYS
5. BEING SO RESTLESS THAT IT IS HARD TO SIT STILL: NOT AT ALL
6. BECOMING EASILY ANNOYED OR IRRITABLE: NEARLY EVERY DAY
1. FEELING NERVOUS, ANXIOUS, OR ON EDGE: SEVERAL DAYS
2. NOT BEING ABLE TO STOP OR CONTROL WORRYING: NOT AT ALL

## 2023-04-27 ASSESSMENT — PATIENT HEALTH QUESTIONNAIRE - PHQ9
5. POOR APPETITE OR OVEREATING: NOT AT ALL
SUM OF ALL RESPONSES TO PHQ QUESTIONS 1-9: 8

## 2023-04-28 LAB
ALBUMIN SERPL BCG-MCNC: 4.5 G/DL (ref 3.5–5.2)
ALP SERPL-CCNC: 87 U/L (ref 35–104)
ALT SERPL W P-5'-P-CCNC: 15 U/L (ref 10–35)
ANION GAP SERPL CALCULATED.3IONS-SCNC: 11 MMOL/L (ref 7–15)
AST SERPL W P-5'-P-CCNC: 21 U/L (ref 10–35)
BILIRUB SERPL-MCNC: 0.3 MG/DL
BUN SERPL-MCNC: 12 MG/DL (ref 6–20)
CALCIUM SERPL-MCNC: 9.5 MG/DL (ref 8.6–10)
CHLORIDE SERPL-SCNC: 106 MMOL/L (ref 98–107)
CHOLEST SERPL-MCNC: 232 MG/DL
CREAT SERPL-MCNC: 1.01 MG/DL (ref 0.51–0.95)
DEPRECATED HCO3 PLAS-SCNC: 23 MMOL/L (ref 22–29)
GFR SERPL CREATININE-BSD FRML MDRD: 69 ML/MIN/1.73M2
GLUCOSE SERPL-MCNC: 96 MG/DL (ref 70–99)
HDLC SERPL-MCNC: 46 MG/DL
LDLC SERPL CALC-MCNC: 144 MG/DL
NONHDLC SERPL-MCNC: 186 MG/DL
POTASSIUM SERPL-SCNC: 4.8 MMOL/L (ref 3.4–5.3)
PROT SERPL-MCNC: 7.2 G/DL (ref 6.4–8.3)
SODIUM SERPL-SCNC: 140 MMOL/L (ref 136–145)
TRIGL SERPL-MCNC: 210 MG/DL
TSH SERPL DL<=0.005 MIU/L-ACNC: 2.54 UIU/ML (ref 0.3–4.2)

## 2023-05-01 LAB
BKR LAB AP GYN ADEQUACY: NORMAL
BKR LAB AP GYN INTERPRETATION: NORMAL
BKR LAB AP HPV REFLEX: NORMAL
BKR LAB AP PREVIOUS ABNORMAL: NORMAL
PATH REPORT.COMMENTS IMP SPEC: NORMAL
PATH REPORT.COMMENTS IMP SPEC: NORMAL
PATH REPORT.RELEVANT HX SPEC: NORMAL

## 2023-05-02 ENCOUNTER — MYC MEDICAL ADVICE (OUTPATIENT)
Dept: OBGYN | Facility: CLINIC | Age: 48
End: 2023-05-02
Payer: COMMERCIAL

## 2023-05-02 LAB
HUMAN PAPILLOMA VIRUS 16 DNA: NEGATIVE
HUMAN PAPILLOMA VIRUS 18 DNA: NEGATIVE
HUMAN PAPILLOMA VIRUS FINAL DIAGNOSIS: NORMAL
HUMAN PAPILLOMA VIRUS OTHER HR: NEGATIVE

## 2023-05-02 NOTE — TELEPHONE ENCOUNTER
Panel Management Review    Date of last visit with a [facility] provider: Shefali Waldron on 4/27/23.  Date of next visit with a [facility] provider: none    Health Maintenance List    Health Maintenance   Topic Date Due     ADVANCE CARE PLANNING  Never done     DEPRESSION ACTION PLAN  Never done     MIGRAINE ACTION PLAN  Never done     HEPATITIS B IMMUNIZATION (1 of 3 - 3-dose series) Never done     HEPATITIS C SCREENING  Never done     COLORECTAL CANCER SCREENING  01/15/2018     COVID-19 Vaccine (4 - Booster for Pfizer series) 02/03/2022     INFLUENZA VACCINE (1) 09/01/2022     PHQ-9  10/27/2023     YEARLY PREVENTIVE VISIT  04/27/2024     MAMMO SCREENING  04/27/2024     DTAP/TDAP/TD IMMUNIZATION (3 - Td or Tdap) 04/05/2026     HPV TEST  04/27/2026     PAP  04/27/2026     LIPID  04/27/2028     HIV SCREENING  Completed     Pneumococcal Vaccine: Pediatrics (0 to 5 Years) and At-Risk Patients (6 to 64 Years)  Aged Out     IPV IMMUNIZATION  Aged Out     MENINGITIS IMMUNIZATION  Aged Out       Composite cancer screening  Chart review shows that this patient is due/due soon for the following Colonoscopy  Lab Results   Component Value Date    PAP NIL 09/29/2020     Past Surgical History:   Procedure Laterality Date     LEEP TX, CERVICAL      done elsewhere   Summary:    Patient is due/failing the following:   Colonoscopy    Action needed: Patient needs referral/order:     Type of outreach:  Sent [employer] message.      Staff Signature:  Naheed Arenas MA on 5/2/2023 at 11:44 AM

## 2023-06-27 ENCOUNTER — OFFICE VISIT (OUTPATIENT)
Dept: OBGYN | Facility: CLINIC | Age: 48
End: 2023-06-27
Payer: COMMERCIAL

## 2023-06-27 VITALS
SYSTOLIC BLOOD PRESSURE: 119 MMHG | BODY MASS INDEX: 31.01 KG/M2 | WEIGHT: 197.6 LBS | DIASTOLIC BLOOD PRESSURE: 82 MMHG | HEIGHT: 67 IN

## 2023-06-27 DIAGNOSIS — N95.1 PERIMENOPAUSAL: ICD-10-CM

## 2023-06-27 DIAGNOSIS — N93.9 ABNORMAL UTERINE BLEEDING (AUB): Primary | ICD-10-CM

## 2023-06-27 LAB
ERYTHROCYTE [DISTWIDTH] IN BLOOD BY AUTOMATED COUNT: 12.4 % (ref 10–15)
HCT VFR BLD AUTO: 35.7 % (ref 35–47)
HGB BLD-MCNC: 12.4 G/DL (ref 11.7–15.7)
MCH RBC QN AUTO: 31.9 PG (ref 26.5–33)
MCHC RBC AUTO-ENTMCNC: 34.7 G/DL (ref 31.5–36.5)
MCV RBC AUTO: 92 FL (ref 78–100)
PLATELET # BLD AUTO: 248 10E3/UL (ref 150–450)
RBC # BLD AUTO: 3.89 10E6/UL (ref 3.8–5.2)
WBC # BLD AUTO: 8.4 10E3/UL (ref 4–11)

## 2023-06-27 PROCEDURE — 36415 COLL VENOUS BLD VENIPUNCTURE: CPT | Performed by: NURSE PRACTITIONER

## 2023-06-27 PROCEDURE — 82728 ASSAY OF FERRITIN: CPT | Performed by: NURSE PRACTITIONER

## 2023-06-27 PROCEDURE — 85027 COMPLETE CBC AUTOMATED: CPT | Performed by: NURSE PRACTITIONER

## 2023-06-27 PROCEDURE — 99213 OFFICE O/P EST LOW 20 MIN: CPT | Performed by: NURSE PRACTITIONER

## 2023-06-27 PROCEDURE — 84443 ASSAY THYROID STIM HORMONE: CPT | Performed by: NURSE PRACTITIONER

## 2023-06-27 RX ORDER — NORETHINDRONE ACETATE AND ETHINYL ESTRADIOL 1.5-30(21)
1 KIT ORAL DAILY
Qty: 84 TABLET | Refills: 3 | Status: SHIPPED | OUTPATIENT
Start: 2023-06-27 | End: 2023-06-30

## 2023-06-27 NOTE — PROGRESS NOTES
SUBJECTIVE:                                                   Prema Mike is a 47 year old female who presents to clinic today for the following health issue(s):  Patient presents with:  Abnormal Bleeding Problem      HPI:  Prema is here today for heavy and irregular menstrual bleeding. She was seen in clinic in 2023 and was having heavy periods as well. She was placed on VALARIE. She states she had a more normal cycle for the first month and then bleeding happened more irregularly after that. She wears a pad and tampon on her heavy days and  is saturating through both, sometimes in an hour. This is affecting her quality or life and job. Started bleeding the second Tuesday of this current pill pack. Period starts light brown for a couple days and then is heavy for 3-4, then returns to light. She has cramping with the bleeding and has nausea plus fatigue. She denies bowel and bladder issues, and pelvic pain. She has not had migraines since starting the VALARIE. Today she is on a lighter day of her menses.    Patient's last menstrual period was 2023 (exact date)..     Patient is sexually active, .  Using oral contraceptives for contraception.    reports that she has never smoked. She has never used smokeless tobacco.    STD testing offered?  Declined    Health maintenance updated:  See Care Gaps     Today's PHQ-2 Score:       2023     9:26 AM   PHQ-2 (  [employer])   Q1: Little interest or pleasure in doing things 1   Q2: Feeling down, depressed or hopeless 1   PHQ-2 Score 2     Today's PHQ-9 Score:       2023     9:26 AM   PHQ-9 SCORE   PHQ-9 Total Score 8     Today's GALI-7 Score:       2023     9:26 AM   GALI-7 SCORE   Total Score 5       Problem list and histories reviewed & adjusted, as indicated.  Additional history: as documented.    Patient Active Problem List   Diagnosis     Migraine with aura     Depressive disorder, not elsewhere classified     Rash and other nonspecific  skin eruption     Depression     Spastic colon     Herpes simplex     Low HDL (under 40)     Anxiety     Club foot, fetal, affecting care of mother, antepartum, single gestation     Past Surgical History:   Procedure Laterality Date     LEEP TX, CERVICAL      done elsewhere      Social History     Tobacco Use     Smoking status: Never     Smokeless tobacco: Never   Substance Use Topics     Alcohol use: Yes     Alcohol/week: 0.0 standard drinks of alcohol     Comment: 2 beers a week      Problem (# of Occurrences) Relation (Name,Age of Onset)    Diabetes (1) Paternal Grandfather    Hypertension (2) Paternal Grandfather, Mother    Other Cancer (1) Maternal Grandmother: OVARIAN / UTERINE     Hyperlipidemia (8) Paternal Grandfather, Paternal Grandmother, Brother, Mother, Maternal Grandmother, Father, Sister, Other (aunt)    Coronary Artery Disease (2) Paternal Grandfather, Maternal Grandfather    Colon Cancer (1) Paternal Grandfather    Cervical Cancer (2) Mother, Sister            Current Outpatient Medications   Medication Sig     eletriptan (RELPAX) 40 MG tablet Take 1 tablet (40 mg) by mouth at onset of headache for migraine May repeat dose in 2 hours.  Do not exceed 80 mg in 24 hours     Multiple Vitamins-Minerals (MULTIVITAMIN PO)      norethindrone-ethinyl estradiol (JUNEL FE 1/20) 1-20 MG-MCG tablet Take 1 tablet by mouth daily     norethindrone-ethinyl estradiol-iron (MICROGESTIN FE1.5/30) 1.5-30 MG-MCG tablet Take 1 tablet by mouth daily     valACYclovir (VALTREX) 500 MG tablet      sertraline (ZOLOFT) 100 MG tablet Take 1 tablet (100 mg) by mouth daily (Patient not taking: Reported on 6/27/2023)     No current facility-administered medications for this visit.     Allergies   Allergen Reactions     No Known Allergies        ROS:  12 point review of systems negative other than symptoms noted below or in the HPI.  No urinary frequency or dysuria, bladder or kidney problems      OBJECTIVE:     /82   Ht  "1.702 m (5' 7\")   Wt 89.6 kg (197 lb 9.6 oz)   LMP 06/06/2023 (Exact Date)   BMI 30.95 kg/m    Body mass index is 30.95 kg/m .    Exam:  Constitutional:  Appearance: Well nourished, well developed alert, in no acute distress  Psychiatric:  Mentation appears normal. Bleeding affecting her quality of life.  Pelvic Exam:  External Genitalia:     Normal appearance for age, no discharge present, no tenderness present, no inflammatory lesions present, color normal  Vagina:     Normal vaginal vault without central or paravaginal defects, menstrual bleeding noted, no inflammatory lesions present, no masses. present  Bladder:     Nontender to palpation  Urethra:   Urethral Body:  Urethra palpation normal, urethra structural support normal   Urethral Meatus:  No erythema or lesions present  Uterus:     Uterus: boggy, normal sized and nontender  Adnexa:     No adnexal tenderness present, no adnexal masses present  Perineum:     Perineum within normal limits, no evidence of trauma, no rashes or skin lesions present  Anus:     Anus within normal limits, no hemorrhoids present  Pubic Hair:     Normal pubic hair distribution for age  Genitalia and Groin:     No rashes present, no lesions present, no areas of discoloration, no masses present       In-Clinic Test Results:  Results for orders placed or performed in visit on 06/27/23 (from the past 24 hour(s))   CBC with platelets   Result Value Ref Range    WBC Count 8.4 4.0 - 11.0 10e3/uL    RBC Count 3.89 3.80 - 5.20 10e6/uL    Hemoglobin 12.4 11.7 - 15.7 g/dL    Hematocrit 35.7 35.0 - 47.0 %    MCV 92 78 - 100 fL    MCH 31.9 26.5 - 33.0 pg    MCHC 34.7 31.5 - 36.5 g/dL    RDW 12.4 10.0 - 15.0 %    Platelet Count 248 150 - 450 10e3/uL       ASSESSMENT/PLAN:                                                    (N93.9) Abnormal uterine bleeding (AUB)  (primary encounter diagnosis)  Plan: US Transvaginal Pelvic Non-OB, CBC with         platelets, TSH with free T4 reflex, " Ferritin    (N95.1) Perimenopausal  Plan: norethindrone-ethinyl estradiol-iron         (MICROGESTIN FE1.5/30) 1.5-30 MG-MCG tablet    There are no Patient Instructions on file for this visit.     Will draw CBC with platelets, TSH, and ferritin level. Anticipate patient to make appointment to schedule pelvic ultrasound. In the meantime, changed VALARIE formulation. Will reach out with lab results.     Anushka GUPTA, DNP student    I was present with the student who participated in the service and in the documentation of the note. I have verified the history and personally performed the physical exam and medical decision-making. I agree with the assessment and plan of care as documented in the note.      Shefali Waldron, NEO CNP  [facility] [place]

## 2023-06-28 ENCOUNTER — ANCILLARY PROCEDURE (OUTPATIENT)
Dept: ULTRASOUND IMAGING | Facility: CLINIC | Age: 48
End: 2023-06-28
Payer: COMMERCIAL

## 2023-06-28 DIAGNOSIS — N93.9 ABNORMAL UTERINE BLEEDING (AUB): ICD-10-CM

## 2023-06-28 LAB
FERRITIN SERPL-MCNC: 34 NG/ML (ref 6–175)
TSH SERPL DL<=0.005 MIU/L-ACNC: 2.71 UIU/ML (ref 0.3–4.2)

## 2023-06-28 PROCEDURE — 76830 TRANSVAGINAL US NON-OB: CPT | Performed by: OBSTETRICS & GYNECOLOGY

## 2023-06-30 RX ORDER — NORETHINDRONE ACETATE AND ETHINYL ESTRADIOL 1.5-30(21)
1 KIT ORAL DAILY
Qty: 112 TABLET | Refills: 4 | Status: SHIPPED | OUTPATIENT
Start: 2023-06-30 | End: 2024-06-11

## 2024-06-11 ENCOUNTER — OFFICE VISIT (OUTPATIENT)
Dept: OBGYN | Facility: CLINIC | Age: 49
End: 2024-06-11
Payer: COMMERCIAL

## 2024-06-11 ENCOUNTER — ANCILLARY PROCEDURE (OUTPATIENT)
Dept: MAMMOGRAPHY | Facility: CLINIC | Age: 49
End: 2024-06-11
Payer: COMMERCIAL

## 2024-06-11 VITALS
HEIGHT: 67 IN | WEIGHT: 201.8 LBS | SYSTOLIC BLOOD PRESSURE: 130 MMHG | DIASTOLIC BLOOD PRESSURE: 80 MMHG | BODY MASS INDEX: 31.67 KG/M2

## 2024-06-11 DIAGNOSIS — Z13.21 ENCOUNTER FOR VITAMIN DEFICIENCY SCREENING: ICD-10-CM

## 2024-06-11 DIAGNOSIS — Z13.6 ENCOUNTER FOR LIPID SCREENING FOR CARDIOVASCULAR DISEASE: ICD-10-CM

## 2024-06-11 DIAGNOSIS — B00.9 HERPES SIMPLEX: ICD-10-CM

## 2024-06-11 DIAGNOSIS — Z12.11 SCREEN FOR COLON CANCER: ICD-10-CM

## 2024-06-11 DIAGNOSIS — Z13.228 SCREENING FOR METABOLIC DISORDER: ICD-10-CM

## 2024-06-11 DIAGNOSIS — R53.83 FATIGUE, UNSPECIFIED TYPE: ICD-10-CM

## 2024-06-11 DIAGNOSIS — E78.2 MIXED HYPERLIPIDEMIA: ICD-10-CM

## 2024-06-11 DIAGNOSIS — Z12.31 VISIT FOR SCREENING MAMMOGRAM: ICD-10-CM

## 2024-06-11 DIAGNOSIS — N93.9 ABNORMAL UTERINE BLEEDING (AUB): ICD-10-CM

## 2024-06-11 DIAGNOSIS — Z13.29 SCREENING FOR THYROID DISORDER: ICD-10-CM

## 2024-06-11 DIAGNOSIS — Z01.419 ENCOUNTER FOR GYNECOLOGICAL EXAMINATION WITHOUT ABNORMAL FINDING: Primary | ICD-10-CM

## 2024-06-11 DIAGNOSIS — Z13.0 SCREENING FOR DISORDER OF BLOOD AND BLOOD-FORMING ORGANS: ICD-10-CM

## 2024-06-11 DIAGNOSIS — Z13.220 ENCOUNTER FOR LIPID SCREENING FOR CARDIOVASCULAR DISEASE: ICD-10-CM

## 2024-06-11 DIAGNOSIS — F33.9 EPISODE OF RECURRENT MAJOR DEPRESSIVE DISORDER, UNSPECIFIED DEPRESSION EPISODE SEVERITY (H): ICD-10-CM

## 2024-06-11 DIAGNOSIS — N80.03 ADENOMYOSIS: ICD-10-CM

## 2024-06-11 LAB
BASOPHILS # BLD AUTO: 0 10E3/UL (ref 0–0.2)
BASOPHILS NFR BLD AUTO: 0 %
EOSINOPHIL # BLD AUTO: 0.1 10E3/UL (ref 0–0.7)
EOSINOPHIL NFR BLD AUTO: 1 %
ERYTHROCYTE [DISTWIDTH] IN BLOOD BY AUTOMATED COUNT: 12.2 % (ref 10–15)
HBA1C MFR BLD: 5 % (ref 0–5.6)
HCT VFR BLD AUTO: 38.4 % (ref 35–47)
HGB BLD-MCNC: 13 G/DL (ref 11.7–15.7)
IMM GRANULOCYTES # BLD: 0 10E3/UL
IMM GRANULOCYTES NFR BLD: 0 %
LYMPHOCYTES # BLD AUTO: 2.8 10E3/UL (ref 0.8–5.3)
LYMPHOCYTES NFR BLD AUTO: 36 %
MCH RBC QN AUTO: 31.2 PG (ref 26.5–33)
MCHC RBC AUTO-ENTMCNC: 33.9 G/DL (ref 31.5–36.5)
MCV RBC AUTO: 92 FL (ref 78–100)
MONOCYTES # BLD AUTO: 0.4 10E3/UL (ref 0–1.3)
MONOCYTES NFR BLD AUTO: 5 %
NEUTROPHILS # BLD AUTO: 4.5 10E3/UL (ref 1.6–8.3)
NEUTROPHILS NFR BLD AUTO: 58 %
PLATELET # BLD AUTO: 232 10E3/UL (ref 150–450)
RBC # BLD AUTO: 4.17 10E6/UL (ref 3.8–5.2)
WBC # BLD AUTO: 7.8 10E3/UL (ref 4–11)

## 2024-06-11 PROCEDURE — 84443 ASSAY THYROID STIM HORMONE: CPT | Performed by: NURSE PRACTITIONER

## 2024-06-11 PROCEDURE — 99396 PREV VISIT EST AGE 40-64: CPT | Performed by: NURSE PRACTITIONER

## 2024-06-11 PROCEDURE — 77067 SCR MAMMO BI INCL CAD: CPT | Mod: TC | Performed by: STUDENT IN AN ORGANIZED HEALTH CARE EDUCATION/TRAINING PROGRAM

## 2024-06-11 PROCEDURE — 87624 HPV HI-RISK TYP POOLED RSLT: CPT | Performed by: NURSE PRACTITIONER

## 2024-06-11 PROCEDURE — 83036 HEMOGLOBIN GLYCOSYLATED A1C: CPT | Performed by: NURSE PRACTITIONER

## 2024-06-11 PROCEDURE — 85025 COMPLETE CBC W/AUTO DIFF WBC: CPT | Performed by: NURSE PRACTITIONER

## 2024-06-11 PROCEDURE — 80053 COMPREHEN METABOLIC PANEL: CPT | Performed by: NURSE PRACTITIONER

## 2024-06-11 PROCEDURE — 82306 VITAMIN D 25 HYDROXY: CPT | Performed by: NURSE PRACTITIONER

## 2024-06-11 PROCEDURE — 86800 THYROGLOBULIN ANTIBODY: CPT | Performed by: NURSE PRACTITIONER

## 2024-06-11 PROCEDURE — G0124 SCREEN C/V THIN LAYER BY MD: HCPCS | Performed by: PATHOLOGY

## 2024-06-11 PROCEDURE — 99459 PELVIC EXAMINATION: CPT | Performed by: NURSE PRACTITIONER

## 2024-06-11 PROCEDURE — 82607 VITAMIN B-12: CPT | Performed by: NURSE PRACTITIONER

## 2024-06-11 PROCEDURE — 86376 MICROSOMAL ANTIBODY EACH: CPT | Performed by: NURSE PRACTITIONER

## 2024-06-11 PROCEDURE — 82728 ASSAY OF FERRITIN: CPT | Performed by: NURSE PRACTITIONER

## 2024-06-11 PROCEDURE — 83540 ASSAY OF IRON: CPT | Performed by: NURSE PRACTITIONER

## 2024-06-11 PROCEDURE — G0145 SCR C/V CYTO,THINLAYER,RESCR: HCPCS | Performed by: NURSE PRACTITIONER

## 2024-06-11 PROCEDURE — 77063 BREAST TOMOSYNTHESIS BI: CPT | Mod: TC | Performed by: STUDENT IN AN ORGANIZED HEALTH CARE EDUCATION/TRAINING PROGRAM

## 2024-06-11 PROCEDURE — 80061 LIPID PANEL: CPT | Performed by: NURSE PRACTITIONER

## 2024-06-11 PROCEDURE — 36415 COLL VENOUS BLD VENIPUNCTURE: CPT | Performed by: NURSE PRACTITIONER

## 2024-06-11 PROCEDURE — 83550 IRON BINDING TEST: CPT | Performed by: NURSE PRACTITIONER

## 2024-06-11 RX ORDER — VALACYCLOVIR HYDROCHLORIDE 500 MG/1
500 TABLET, FILM COATED ORAL DAILY
Qty: 90 TABLET | Refills: 3 | Status: SHIPPED | OUTPATIENT
Start: 2024-06-11

## 2024-06-11 RX ORDER — DROSPIRENONE AND ETHINYL ESTRADIOL 0.03MG-3MG
1 KIT ORAL DAILY
Qty: 112 TABLET | Refills: 4 | Status: SHIPPED | OUTPATIENT
Start: 2024-06-11

## 2024-06-11 RX ORDER — SERTRALINE HYDROCHLORIDE 100 MG/1
100 TABLET, FILM COATED ORAL DAILY
Qty: 90 TABLET | Refills: 3 | Status: SHIPPED | OUTPATIENT
Start: 2024-06-11

## 2024-06-11 RX ORDER — BETAMETHASONE DIPROPIONATE 0.5 MG/ML
LOTION, AUGMENTED TOPICAL
COMMUNITY
Start: 2024-02-12

## 2024-06-11 ASSESSMENT — ANXIETY QUESTIONNAIRES
3. WORRYING TOO MUCH ABOUT DIFFERENT THINGS: NOT AT ALL
7. FEELING AFRAID AS IF SOMETHING AWFUL MIGHT HAPPEN: NOT AT ALL
1. FEELING NERVOUS, ANXIOUS, OR ON EDGE: NOT AT ALL
GAD7 TOTAL SCORE: 0
2. NOT BEING ABLE TO STOP OR CONTROL WORRYING: NOT AT ALL
5. BEING SO RESTLESS THAT IT IS HARD TO SIT STILL: NOT AT ALL
6. BECOMING EASILY ANNOYED OR IRRITABLE: NOT AT ALL
GAD7 TOTAL SCORE: 0
IF YOU CHECKED OFF ANY PROBLEMS ON THIS QUESTIONNAIRE, HOW DIFFICULT HAVE THESE PROBLEMS MADE IT FOR YOU TO DO YOUR WORK, TAKE CARE OF THINGS AT HOME, OR GET ALONG WITH OTHER PEOPLE: NOT DIFFICULT AT ALL

## 2024-06-11 ASSESSMENT — PATIENT HEALTH QUESTIONNAIRE - PHQ9
5. POOR APPETITE OR OVEREATING: NOT AT ALL
SUM OF ALL RESPONSES TO PHQ QUESTIONS 1-9: 9

## 2024-06-11 NOTE — PROGRESS NOTES
Prema is a 48 year old  female who presents for annual exam.     Besides routine health maintenance,  she would like to discuss bleeding since  .    HPI:  The patient's PCP is NO PCP Fasting labs patient here today for her annual GYN exam and mammogram.  She is due for Pap smear today.  She is doing well on her sertraline and Valtrex.  We placed her on oral contraceptive tablets using them in a continuous fashion due to adenomyosis and dysfunctional bleeding.  She has been having daily bleeding whether it is brown spotting or a menstrual-like flow since September.      she is fasting we will complete fasting labs today.    She has been having issues with fatigue. She is a good sleeper.       GYNECOLOGIC HISTORY:    No LMP recorded.    Regular menses? no  Menses some type of bleeding since Sept.    Her current contraception method is: oral contraceptives.  She  reports that she has never smoked. She has never used smokeless tobacco.    Patient is sexually active.  STD testing offered?  Declined  Last PHQ-9 score on record =       2024     2:11 PM   PHQ-9 SCORE   PHQ-9 Total Score 9     Last GAD7 score on record =       2024     2:11 PM   GALI-7 SCORE   Total Score 0     Alcohol Score = 2    HEALTH MAINTENANCE:  Cholesterol:   Recent Labs   Lab Test 23  1037 20  1448   CHOL 232* 247*   HDL 46* 53   * 134*   TRIG 210* 302*     Last Mammo: One year ago, Result: Normal, Next Mammo: Today     Pap:   Lab Results   Component Value Date    GYNINTERP  2023     Negative for Intraepithelial Lesion or Malignancy (NILM)    PAP NIL 2020    PAP NIL 2017    PAP NIL 2008     Colonoscopy: 1/15/2013 Flex Sig , Result: ?, Next Colonoscopy: 45 years.  Dexa:  NA    Health maintenance updated:  Yes  HISTORY:  OB History    Para Term  AB Living   3 3 2 1 0 3   SAB IAB Ectopic Multiple Live Births   0 0 0 0 3      # Outcome Date GA Lbr Rylan/2nd Weight Sex Type Anes  PTL Lv   3 Term 06/15/16 38w1d 03:05 / 00:11 3.17 kg (6 lb 15.8 oz) M Vag-Spont Local  SHANNAN      Apgar1: 8  Apgar5: 9   2 Term 07 38w0d  3.515 kg (7 lb 12 oz) F  EPI  SHANNAN      Birth Comments: SROM      Name: Kimi Cm  04 36w0d  2.977 kg (6 lb 9 oz) M  EPI  SHANNAN      Birth Comments: SROM.  PIH.  Club Foot.      Name: Susi       Patient Active Problem List   Diagnosis    Migraine with aura    Depressive disorder, not elsewhere classified    Rash and other nonspecific skin eruption    Depression    Spastic colon    Herpes simplex    Low HDL (under 40)    Anxiety    Club foot, fetal, affecting care of mother, antepartum, single gestation    Adenomyosis     Past Surgical History:   Procedure Laterality Date    LEEP TX, CERVICAL      done elsewhere      Social History     Tobacco Use    Smoking status: Never    Smokeless tobacco: Never   Substance Use Topics    Alcohol use: Yes     Alcohol/week: 0.0 standard drinks of alcohol     Comment: 2 beers a week      Problem (# of Occurrences) Relation (Name,Age of Onset)    Diabetes (1) Paternal Grandfather    Hypertension (2) Paternal Grandfather, Mother    Other Cancer (1) Maternal Grandmother: OVARIAN / UTERINE     Hyperlipidemia (8) Paternal Grandfather, Paternal Grandmother, Brother, Mother, Maternal Grandmother, Father, Sister, Other (aunt)    Coronary Artery Disease (2) Paternal Grandfather, Maternal Grandfather    Colon Cancer (1) Paternal Grandfather    Cervical Cancer (2) Mother, Sister              Current Outpatient Medications   Medication Sig Dispense Refill    augmented betamethasone dipropionate (DIPROLENE) 0.05 % external lotion APPLY 2 TIMES PER DAY TO SCALP AREAS FOR 2-3 WEEKS THEN SAT, SUN AND WED      drospirenone-ethinyl estradiol (ABIEL) 3-0.03 MG tablet Take 1 tablet by mouth daily . Active tabs only, skip placebo pills. Take continuously. 112 tablet 4    eletriptan (RELPAX) 40 MG tablet Take 1 tablet (40 mg) by mouth at onset of  "headache for migraine May repeat dose in 2 hours.  Do not exceed 80 mg in 24 hours 18 tablet 3    metroNIDAZOLE (METROCREAM) 0.75 % external cream APPLY TO PIMPLY BUMPS ON FACE TWICE A DAY      Multiple Vitamins-Minerals (MULTIVITAMIN PO)       sertraline (ZOLOFT) 100 MG tablet Take 1 tablet (100 mg) by mouth daily 90 tablet 3    valACYclovir (VALTREX) 500 MG tablet Take 1 tablet (500 mg) by mouth daily 90 tablet 3     No current facility-administered medications for this visit.     Allergies   Allergen Reactions    No Known Allergies        Past medical, surgical, social and family histories were reviewed and updated in [facility].    EXAM:  /80   Ht 1.708 m (5' 7.25\")   Wt 91.5 kg (201 lb 12.8 oz)   Breastfeeding No   BMI 31.37 kg/m     BMI: Body mass index is 31.37 kg/m .    PHYSICAL EXAM:  Constitutional:   Appearance: Well nourished, well developed, alert, in no acute distress  Chest:  Respiratory Effort:  Breathing unlabored  Cardiovascular:    Heart: Auscultation:  Regular rate, normal rhythm, no murmurs present  Breasts: Inspection of Breasts:  No lymphadenopathy present., Palpation of Breasts and Axillae:  No masses present on palpation, no breast tenderness., Axillary Lymph Nodes:  No lymphadenopathy present., and No nodularity, asymmetry or nipple discharge bilaterally.  Skin:  General Inspection:  No rashes present, no lesions present, no areas of  discoloration  Neurologic:    Mental Status:  Oriented X3.  Normal strength and tone, sensory exam                grossly normal, mentation intact and speech normal.    Psychiatric:   Mentation appears normal and affect normal/bright.         Pelvic Exam:  External Genitalia:     Normal appearance for age, no discharge present, no tenderness present, no inflammatory lesions present, color normal  Vagina:     Normal vaginal vault without central or paravaginal defects, no discharge present, no inflammatory lesions present, no masses present  Bladder:  "    Nontender to palpation  Urethra:   Urethral Body:  Urethra palpation normal, urethra structural support normal   Urethral Meatus:  No erythema or lesions present  Cervix:     Appearance polyp-like protrusion from os. Friable with pap collection.  nontender to palpation, brown spotting bleeding present  Uterus:     Uterus: enlarged, boggy and nontender, anteverted in position.   Adnexa:     No adnexal tenderness present, no adnexal masses present  Perineum:     Perineum within normal limits, no evidence of trauma, no rashes or skin lesions present  Anus:     Anus within normal limits, no hemorrhoids present  Inguinal Lymph Nodes:     No lymphadenopathy present  Pubic Hair:     Normal pubic hair distribution for age  Genitalia and Groin:     No rashes present, no lesions present, no areas of discoloration, no masses present    COUNSELING:   Special attention given to:        Regular exercise       Healthy diet/nutrition       Contraception       Colorectal Cancer Screening       (Desiree)menopause management    BMI: Body mass index is 31.37 kg/m .  Weight management plan: Discussed healthy diet and exercise guidelines    ASSESSMENT:  48 year old female with satisfactory annual exam.    ICD-10-CM    1. Encounter for gynecological examination without abnormal finding  Z01.419 Gynecologic Cytology (Pap) and HPV - Recommended Age 30-65 Years      2. Screen for colon cancer  Z12.11 Colonoscopy Screening  Referral      3. Screening for thyroid disorder  Z13.29 TSH with free T4 reflex     Anti thyroglobulin antibody     Thyroid peroxidase antibody     TSH with free T4 reflex     Anti thyroglobulin antibody     Thyroid peroxidase antibody      4. Screening for metabolic disorder  Z13.228 Hemoglobin A1c     Comprehensive metabolic panel     Hemoglobin A1c     Comprehensive metabolic panel      5. Encounter for lipid screening for cardiovascular disease  Z13.220 Lipid Profile    Z13.6 Lipid Profile      6. Encounter  for vitamin deficiency screening  Z13.21 Vitamin D Deficiency     Vitamin B12     Vitamin D Deficiency     Vitamin B12      7. Screening for disorder of blood and blood-forming organs  Z13.0 CBC with Platelets & Differential     Ferritin     Iron & Iron Binding Capacity     CBC with Platelets & Differential     Ferritin     Iron & Iron Binding Capacity      8. Fatigue, unspecified type  R53.83 Vitamin D Deficiency     CBC with Platelets & Differential     Ferritin     Vitamin B12     Vitamin D Deficiency     CBC with Platelets & Differential     Ferritin     Vitamin B12      9. Abnormal uterine bleeding (AUB)  N93.9 drospirenone-ethinyl estradiol (ABIEL) 3-0.03 MG tablet      10. Herpes simplex  B00.9 valACYclovir (VALTREX) 500 MG tablet      11. Episode of recurrent major depressive disorder, unspecified depression episode severity (H24)  F33.9 sertraline (ZOLOFT) 100 MG tablet      12. Adenomyosis  N80.03 drospirenone-ethinyl estradiol (ABIEL) 3-0.03 MG tablet          PLAN:  47 yo female with a polyp like protrusion from cervix, pap updated. Discussed adenomyosis management. Will try to change pills again. Discussed definitive therapy.   Labs done today.   Colonoscopy due.     NEO Durham CNP

## 2024-06-12 LAB
ALBUMIN SERPL BCG-MCNC: 4.1 G/DL (ref 3.5–5.2)
ALP SERPL-CCNC: 61 U/L (ref 40–150)
ALT SERPL W P-5'-P-CCNC: 16 U/L (ref 0–50)
ANION GAP SERPL CALCULATED.3IONS-SCNC: 11 MMOL/L (ref 7–15)
AST SERPL W P-5'-P-CCNC: 22 U/L (ref 0–45)
BILIRUB SERPL-MCNC: 0.3 MG/DL
BUN SERPL-MCNC: 7.7 MG/DL (ref 6–20)
CALCIUM SERPL-MCNC: 8.9 MG/DL (ref 8.6–10)
CHLORIDE SERPL-SCNC: 110 MMOL/L (ref 98–107)
CHOLEST SERPL-MCNC: 208 MG/DL
CREAT SERPL-MCNC: 0.97 MG/DL (ref 0.51–0.95)
DEPRECATED HCO3 PLAS-SCNC: 21 MMOL/L (ref 22–29)
EGFRCR SERPLBLD CKD-EPI 2021: 72 ML/MIN/1.73M2
FASTING STATUS PATIENT QL REPORTED: ABNORMAL
FASTING STATUS PATIENT QL REPORTED: ABNORMAL
FERRITIN SERPL-MCNC: 70 NG/ML (ref 6–175)
GLUCOSE SERPL-MCNC: 88 MG/DL (ref 70–99)
HDLC SERPL-MCNC: 41 MG/DL
HPV HR 12 DNA CVX QL NAA+PROBE: NEGATIVE
HPV16 DNA CVX QL NAA+PROBE: NEGATIVE
HPV18 DNA CVX QL NAA+PROBE: NEGATIVE
HUMAN PAPILLOMA VIRUS FINAL DIAGNOSIS: NORMAL
IRON BINDING CAPACITY (ROCHE): 306 UG/DL (ref 240–430)
IRON SATN MFR SERPL: 36 % (ref 15–46)
IRON SERPL-MCNC: 109 UG/DL (ref 37–145)
LDLC SERPL CALC-MCNC: 122 MG/DL
NONHDLC SERPL-MCNC: 167 MG/DL
POTASSIUM SERPL-SCNC: 4.1 MMOL/L (ref 3.4–5.3)
PROT SERPL-MCNC: 6.6 G/DL (ref 6.4–8.3)
SODIUM SERPL-SCNC: 142 MMOL/L (ref 135–145)
THYROGLOB AB SERPL IA-ACNC: <20 IU/ML
THYROPEROXIDASE AB SERPL-ACNC: <10 IU/ML
TRIGL SERPL-MCNC: 223 MG/DL
TSH SERPL DL<=0.005 MIU/L-ACNC: 2.59 UIU/ML (ref 0.3–4.2)
VIT B12 SERPL-MCNC: 236 PG/ML (ref 232–1245)
VIT D+METAB SERPL-MCNC: 28 NG/ML (ref 20–50)

## 2024-06-18 LAB
BKR LAB AP GYN ADEQUACY: ABNORMAL
BKR LAB AP GYN INTERPRETATION: ABNORMAL
BKR LAB AP PREVIOUS ABNORMAL: ABNORMAL
PATH REPORT.COMMENTS IMP SPEC: ABNORMAL
PATH REPORT.COMMENTS IMP SPEC: ABNORMAL
PATH REPORT.RELEVANT HX SPEC: ABNORMAL

## 2024-06-19 ENCOUNTER — PATIENT OUTREACH (OUTPATIENT)
Dept: OBGYN | Facility: CLINIC | Age: 49
End: 2024-06-19
Payer: COMMERCIAL

## 2024-08-01 DIAGNOSIS — Z01.812 ENCOUNTER FOR PREPROCEDURAL LABORATORY EXAMINATION: Primary | ICD-10-CM

## 2024-08-02 DIAGNOSIS — N95.1 PERIMENOPAUSAL: ICD-10-CM

## 2024-08-02 RX ORDER — NORETHINDRONE ACETATE AND ETHINYL ESTRADIOL AND FERROUS FUMARATE 1.5-30(21)
KIT ORAL
Qty: 112 TABLET | Refills: 4 | OUTPATIENT
Start: 2024-08-02

## 2024-08-02 NOTE — TELEPHONE ENCOUNTER
Requested Prescriptions   Pending Prescriptions Disp Refills    VALDEZ SOLOMON 1.5/30 1.5-30 MG-MCG tablet [Pharmacy Med Name: VALDEZ SOLOMON 1.5-30 TABLET] 112 tablet 4     Sig: TAKE 1 TABLET BY MOUTH DAILY . ACTIVE TABLETS ONLY. SKIP PLACEBO PILLS.       Contraceptives Protocol Failed - 8/2/2024 12:55 AM        Failed - Medication is active on med list        Failed - Medication indicated for associated diagnosis     Medication is associated with one or more of the following diagnoses:  Contraception  Acne  Dysmenorrhea  Menorrhagia  Amenorrhea  PCOS  Premenstrual Dysphoric Disorder  Irregular menses  Endometriosis          Passed - Patient is not a current smoker if age is 35 or older        Passed - Recent (12 mo) or future (30 days) visit within the authorizing provider's specialty     The patient must have completed an in-person or virtual visit within the past 12 months or has a future visit scheduled within the next 90 days with the authorizing provider s specialty.  Urgent care and e-visits do not quality as an office visit for this protocol.          Passed - No active pregnancy on record        Passed - No positive pregnancy test in past 12 months           Last office visit: 6/11/2024 ; last virtual visit: Visit date not found with prescribing provider:  Chivo   Future Office Visit:      47 yo female with a polyp like protrusion from cervix, pap updated. Discussed adenomyosis management. Will try to change pills again. Discussed definitive therapy.     Pt prescribed drospirenone-ethinyl estradiol (ABIEL) 3-0.03 MG tablet for the year om 6/11/24, med not on active med list.    Rx denied   Page Macias RN on 8/2/2024 at 1:49 PM  [facility] Triage

## 2024-08-13 NOTE — PROGRESS NOTES
INDICATIONS:                                                    Is a pregnancy test required: Yes.  Was it positive or negative?  Negative  Was a consent obtained?  Yes      Prema Mike, is a 48 year old female, who had a recent AGC pap.  HPV Negative Yes prior history of abnormal pap. Here today for colposcopy. Discussed indication, risks of infection and bleeding.    PAP/HPV HISTORY:  Lab Results   Component Value Date    PAP/HPV AGC; HPV: neg 06/11/2024    PAP/HPV NIL; HPV: neg 09/29/2020 1993 LEEP  1/07, 9/07, 9/08, 9/10, 11/11, 12/13 NIL paps  12/12, 11/17, 9/20  NIL/Neg HPV-every 3 yr paps  4/27/23 NIL pap, neg HR HPV. AUB per visit notes. Plan 1 year cotest per visit notes  6/11/24 Atypical Glandular Cells on pap, neg HR HPV. Plan colp with ECC & EMB  06/19/24 Pt notified  8/19/24 Appt    PROCEDURE:                                                      Cervix is stained with acetic acid and viewed colposcopically. Squamocolumnar junction is not visualized in it's entirety. Polyp like material vs prominent ectropion noted.   Cervix then prepped with Betadine. Endometrial Pipelle introduced and uterus sounds to 8cm. Endometrial biopsy performed. Small amount of tissue collected.  Endocervical curettage, followed by biopsies at 1, 6, and 11 o'clock performed  Bleeding noted and managed with pressure and application of Monsel's solution.    POST PROCEDURE:                                                      IMPRESSION: Patient tolerated procedure well    PLAN : Await the results of the biopsies.  There were no complications. Patient was discharged in stable condition.    Patient advised to call the clinic if excessive bleeding, pelvic pain, or fever.     Follow-up based on pathology results.  Tiffanie Tirado MD

## 2024-08-19 ENCOUNTER — LAB (OUTPATIENT)
Dept: LAB | Facility: CLINIC | Age: 49
End: 2024-08-19
Payer: COMMERCIAL

## 2024-08-19 ENCOUNTER — OFFICE VISIT (OUTPATIENT)
Dept: OBGYN | Facility: CLINIC | Age: 49
End: 2024-08-19
Payer: COMMERCIAL

## 2024-08-19 VITALS
WEIGHT: 199 LBS | BODY MASS INDEX: 31.23 KG/M2 | HEIGHT: 67 IN | DIASTOLIC BLOOD PRESSURE: 75 MMHG | SYSTOLIC BLOOD PRESSURE: 123 MMHG

## 2024-08-19 DIAGNOSIS — Z01.812 ENCOUNTER FOR PREPROCEDURAL LABORATORY EXAMINATION: ICD-10-CM

## 2024-08-19 DIAGNOSIS — Z01.812 PRE-PROCEDURE LAB EXAM: ICD-10-CM

## 2024-08-19 DIAGNOSIS — R87.619 ATYPICAL GLANDULAR CELLS OF UNDETERMINED SIGNIFICANCE (AGUS) ON CERVICAL PAP SMEAR: Primary | ICD-10-CM

## 2024-08-19 LAB — HCG UR QL: NEGATIVE

## 2024-08-19 PROCEDURE — 57454 BX/CURETT OF CERVIX W/SCOPE: CPT | Performed by: OBSTETRICS & GYNECOLOGY

## 2024-08-19 PROCEDURE — 81025 URINE PREGNANCY TEST: CPT

## 2024-08-19 PROCEDURE — 88342 IMHCHEM/IMCYTCHM 1ST ANTB: CPT | Performed by: STUDENT IN AN ORGANIZED HEALTH CARE EDUCATION/TRAINING PROGRAM

## 2024-08-19 PROCEDURE — 88305 TISSUE EXAM BY PATHOLOGIST: CPT | Performed by: STUDENT IN AN ORGANIZED HEALTH CARE EDUCATION/TRAINING PROGRAM

## 2024-08-22 LAB
PATH REPORT.COMMENTS IMP SPEC: NORMAL
PATH REPORT.COMMENTS IMP SPEC: NORMAL
PATH REPORT.FINAL DX SPEC: NORMAL
PATH REPORT.GROSS SPEC: NORMAL
PATH REPORT.MICROSCOPIC SPEC OTHER STN: NORMAL
PATH REPORT.RELEVANT HX SPEC: NORMAL
PHOTO IMAGE: NORMAL

## 2024-08-23 ENCOUNTER — PATIENT OUTREACH (OUTPATIENT)
Dept: ONCOLOGY | Facility: CLINIC | Age: 49
End: 2024-08-23
Payer: COMMERCIAL

## 2024-08-23 NOTE — PROGRESS NOTES
"New Patient Hematology / Oncology Nurse Navigator Note     Referral Date: 8/23/24    Referring provider:   Tiffanie Tirado MD   [address] [address]   Phone: [phone]   Fax: [phone]       Referring Clinic/Organization: [facility]     Referred to: Dysplasia Clinic    Requested provider (if applicable): Dr. [facility]    Evaluation for : \"Dysplasia clinic. ELISA pap. Endometriosis on recent colposcopy biopsy \"     Clinical History (per Nurse review of records provided):    8/19/24 Office Visit with OBGYN -- BOOKMARKED  8/19/24 Path -- BOOKMARKED  6/11/24 PAP/HPV -- BOOKMARKED    Clinical Assessment / Barriers to Care (Per Nurse):  Pt lives in [place], [place]      Records Location: [facility]     Records Needed:   N/A    Additional testing needed prior to consult:   N/A    Referral updates and Plan:   Referral received, chart reviewed by nursing, scheduling instructions updated and routed to NPS for scheduling. Will follow-up as needed.     Sangeetha Gill, BSN, RN, PHN, OCN  Hematology/Oncology Nurse Navigator  [facility]  [phone]    "

## 2024-09-03 NOTE — PATIENT INSTRUCTIONS
SCHEDULING:  -See surgery scheduling below.   -RTC 2-4 weeks postop (MD, in-person).       DIAGNOSIS:  AGC-NOS Pap test, hrHPV-. No pathology on cervical & endometrial sampling. Pap test findings likely due to endometriosis, inflammation and reactive changes.       PLAN:  1) AGC Pap test: Likely due to endometriosis, inflammation. Repeat sampling of likely benign endometrial polyp today, we will notify you of results via [facility].    Given abnormal uterine bleeding and desire for definitive management, plan for hysterectomy--see surgery plan below.      SURGERY PLAN:  1.  SURGERY Date to be determined--Surgery to be performed at [facility]: [address] [place]  [address]: Pelvic exam under anesthesia, total laparoscopic hysterectomy (removal of uterus/cervix), bilateral salpingectomy (removal of bilateral fallopian tubes).    2.  Preoperative tests:  None.    3.  PREOPERATIVE EVALUATION:  Please schedule a visit with a primary care NADER for preoperative evaluation within 30 days of surgery.     4.  ONE WEEK BEFORE SURGERY:  Please do not take aspirin, ibuprofen, or fish oil, as these drugs can increase your intraoperative bleeding risk; tylenol is okay to take for pain.    5.  HOSPITAL STAY:  You will be discharged to home the day of surgery; you will need someone to drive you home.      6.  FOLLOW-UP:  Return to the gyn onc clinic approximately 2-4 weeks after surgery for your postoperative evaluation.       POSTOP INSTRUCTIONS:  You will be discharged with ibuprofen 600 mg, which should be taken every 6 hours for the first few days after surgery, then wean off as tolerated.  You can alternate ibuprofen with acetaminophen (tylenol), with a maximum of 4g in 1 day.   You will also be discharged with a narcotic pain medication (e.g. oxycodone). This can be used in addition to ibuprofen if needed; approximately 50% need to take a few narcotic pain tablets, 50% do not need any narcotics. Wean off this  medication first. Common side effects include itching, nausea, constipation, fatigue.     Take a stool softener such as senna or colace twice daily after surgery, unless you develop diarrhea.     Decreased appetite after surgery is normal. Often 6-8 very small meals per day is better tolerated than 2-3 large meals. Decreased appetite can last for up to 4-6 months after surgery.    I encourage you to walk and take the stairs. No heavy lifting: Do not lift >10 pounds for the first 4 weeks, then no more than 20 pounds for the next 4 weeks, then can increase lifting as tolerated.    Swelling of the legs/buttocks is normal for 4-6 weeks. It will eventually decrease. Please call if you have increasing pain, especially in one leg more than the other, or if one leg is significantly more swollen than the other.    You may shower the day after surgery, just let the soap and water run over the incision(s) and pat dry. No sitting in a bathtub for 4 weeks.    No driving while taking narcotic pain medications. After you discontinue the narcotic, sit in your car and make sure you can move your foot from the gas to the break without flinching prior to driving (~1-4 weeks after surgery).     If hysterectomy performed: you may have some vaginal spotting as the top of the vagina heals, and this is normal. If you have heavy bleeding like a moderate to heavy period, or have large gushes of fluid, please call the clinic.    Please call if you have fevers 101 or greater, heavy vaginal bleeding (see above), persistent nausea/vomiting, purulent discharge and/or increasing redness at the incision sites, or other problems or concerns.   You should feel a little bit better every day; call if you start developing more pain, or more nausea.        Activity Restrictions:  -No heavy lifting (>10-20 pounds) for 8 weeks after surgery.  -If hysterectomy performed: Nothing in the vagina (no tampons, no douching, no sex) for 8 weeks after surgery.  -No  driving while taking narcotic pain medications.  After you discontinue the narcotic, sit in your car and make sure you can move your foot from the gas to the brake without flinching prior to driving (approximately 1 week after surgery).        Please call with any questions or concerns. [phone]      Vannesa Angeles MD, MS, FACOG, FACS  9/5/2024  4:23 PM

## 2024-09-03 NOTE — TELEPHONE ENCOUNTER
RECORDS STATUS - ALL OTHER DIAGNOSIS      RECORDS RECEIVED FROM: [facility] - Internal records   DATE RECEIVED: 9/3

## 2024-09-03 NOTE — PROGRESS NOTES
"Consult Note  [facility]  [facility]      Referring provider/Gynecologist:    Tiffanie Tirado MD  [address] [address]          Patient: Prema Mike  : 1975  Language: English   Pronouns: she/her/hers       Date of Visit: Sep 5, 2024       Reason for visit: AGC Pap test , hrHPV-.       History of Present Illness:  Prema Mike is a 48 year old patient referred to the [facility] for evaluation of AGC Pap test.  History as follows:    *HPV vaccination status: Unvaccinated.    Cervical Cancer Screening History:  : LEEP.   -Pathology: \"cervical dysplasia\" (per notes--pathology report not available for review).     07, 08, 9/30/10, 11, 13: Pap test NILM.     17, 20, 23: Pap test NILM, hrHPV-.     24: Pap test AGC-NOS, hrHPV-.   24: Colposcopy by gynecologist Dr. Tirado.  -Findings: SCJ not fully visuazlied. Polyp vs prominent ectropion noted.   -Pathology: Endocervical curettings, cervical biopsy 1:00, 6:00, 11:00 negative for dysplasia/malignancy, remarkable for endometriosis, inflammation and reactive changes; endometrial biopsy with inactive endometrium, negative for hyperplasia, atypia, malignancy.         Subjective:  Prema Mike presents to the Gyn Onc HPV Clinic today for a scheduled consultation, unaccompanied.   Prema reports she started having irregular bleeding ~2 years ago, then had no periods for 2 years, and now has had continuous bleeding. Endometrial sampling negative. She continues OCPs. She is interested in a hysterectomy.   No abnormal discharge.       Medical History:  Past Medical History:   Diagnosis Date    Anxiety 2015    Cervical dysplasia 1993    LEEP    Depression     prozac/ cybalta/celexa/wellbutrin     Herpes simplex     oral HSV-1    Low HDL (under 40) 2008    HDL 44    Migraine highschool    with [employer].  Saw " Dr. Hermes Ivory MD in [place].  He ordered MRI, and gave samples for Relpax.  Also referred her for myofascial pain management.  Responded well to myofascial pain management.    Myofascial pain     headaches    Spastic colon     had flex sig         Surgical History:  Past Surgical History:   Procedure Laterality Date    LEEP TX, CERVICAL      done elsewhere          Gynecologic History:  Menstrual/Menopause status: Perimenopausal.   Hormone therapy/Contraception status: OCPs.   History of STIS: None.  Current sexual activity status:  Rarely sexually active.         Obstetric History:  OB History    Para Term  AB Living   3 3 2 1 0 3   SAB IAB Ectopic Multiple Live Births   0 0 0 0 3      # Outcome Date GA Lbr Rylan/2nd Weight Sex Type Anes PTL Lv   3 Term 06/15/16 38w1d 03:05 / 00:11 3.17 kg (6 lb 15.8 oz) M Vag-Spont Local  SHANNAN      Apgar1: 8  Apgar5: 9   2 Term 07 38w0d  3.515 kg (7 lb 12 oz) F  EPI  SHANNAN      Birth Comments: SROM      Name: Kimi Cm  04 36w0d  2.977 kg (6 lb 9 oz) M  EPI  SHANNAN      Birth Comments: SROM.  PIH.  Club Foot.      Name: Mailen          Medications:   Current Outpatient Medications   Medication Sig Dispense Refill    augmented betamethasone dipropionate (DIPROLENE) 0.05 % external lotion APPLY 2 TIMES PER DAY TO SCALP AREAS FOR 2-3 WEEKS THEN SAT, SUN AND WED      drospirenone-ethinyl estradiol (ABIEL) 3-0.03 MG tablet Take 1 tablet by mouth daily . Active tabs only, skip placebo pills. Take continuously. 112 tablet 4    eletriptan (RELPAX) 40 MG tablet Take 1 tablet (40 mg) by mouth at onset of headache for migraine May repeat dose in 2 hours.  Do not exceed 80 mg in 24 hours 18 tablet 3    metroNIDAZOLE (METROCREAM) 0.75 % external cream APPLY TO PIMPLY BUMPS ON FACE TWICE A DAY      Multiple Vitamins-Minerals (MULTIVITAMIN PO)       sertraline (ZOLOFT) 100 MG tablet Take 1 tablet (100 mg) by mouth daily 90 tablet 3    valACYclovir (VALTREX)  500 MG tablet Take 1 tablet (500 mg) by mouth daily 90 tablet 3     No current facility-administered medications for this visit.          Allergies:   Allergies   Allergen Reactions    No Known Allergies           Social History:  Patient lives with her  and 3 schoolage kids.   Work status: Works for [employer]. Activity: No activity limitations.  Advanced Directives: None. Desired Healthcare Power of :  Fabian Mike.  Social History     Tobacco Use    Smoking status: Never    Smokeless tobacco: Never   Substance Use Topics    Alcohol use: Yes     Alcohol/week: 0.0 standard drinks of alcohol     Comment: 2 beers a week    Drug use: No          Family History:  Family history significant for a second-degree paternal relative with colon cancer, and a second-degree maternal relative with ovarian or uterine cancer.   No known family history of breast, urothelial/renal, prostate or pancreatic cancers.  No known family history of melanoma.  Family History   Problem Relation Age of Onset    Hyperlipidemia Mother     Hypertension Mother     Cervical Cancer Mother     Hyperlipidemia Father     Hyperlipidemia Sister     Cervical Cancer Sister     Hyperlipidemia Brother     Ovarian Cancer Maternal Grandmother         OVARIAN / UTERINE     Hyperlipidemia Maternal Grandmother     Coronary Artery Disease Maternal Grandfather     Hyperlipidemia Paternal Grandmother     Colon Cancer Paternal Grandfather     Diabetes Paternal Grandfather     Hyperlipidemia Paternal Grandfather     Hypertension Paternal Grandfather     Coronary Artery Disease Paternal Grandfather     Hyperlipidemia Other          Physical Exam:   BP (!) 140/84 (BP Location: Right arm, Patient Position: Sitting, Cuff Size: Adult Regular)   Pulse 75   Temp 97.8  F (36.6  C) (Oral)   Resp 16   Wt 88.5 kg (195 lb)   LMP  (LMP Unknown)   SpO2 100%   BMI 30.31 kg/m    Body mass index is 30.31 kg/m .     General Appearance: healthy  and alert, no distress     Musculoskeletal: extremities non tender and without edema    Skin: no lesions or rashes     Neurological: normal gait, no gross defects     Psychiatric: appropriate mood and affect                               Genitourinary: External genitalia and urethral meatus appears normal.  Vagina is normal in appearance. Cervix exam notable for polypoid tissue at the os, likely an endocervical polyp.   Verbal consent provided by the patient prior to pelvic exam.  Each step of the exam was verbalized throughout.  Present for the exam: ZBIGNIEW Coon.       Procedure Risk Statement:   The patient was counseled regarding risks, benefits and alternatives to colposcopy, possible biopsies.  Risks discussed include but not limited to:  Bleeding; infection; injury to adjacent organs; inadequate sample or false negative result.  The patient's questions were answered, and informed consent signed.       Procedure:   Colposcopy:  After the informed consent was signed and time-out procedure was performed, dilute acetic acid was applied to the cervix. Colposcopy performed.  -Squamocolumnar junction fully visualized? No  -Acetowhite changes? None  -Punctations, mosaicism, abnormal vasculature, other abnormalities? Polypoid tissue at the endocervix as noted above--no acetowhite changes.   -Clinical Impression: Normal/endocervical polyp  -Specimens: Endocervical polyp.    A biopsy of the endocervical polyp was taken using polyp forceps. The polyp could not be removed in its entirety. Hemostasis achieved with application of silver nitrate.       Laboratory Examination:  6/11/24 CBC: WBC 7.8, hemoglobin 13.0, platelets 232.  6/11/24 CMP: Creatinine 0.97. Electrolytes within normal limits. Albumin 4.1. Liver enzymes within normal limits.   6/11/24 Hemoglobin A1C: 5.0%.  I have independently reviewed & interpreted the cervical cancer screening & biopsy pathology results detailed in the HPI.       Performance Status:   ECOG Grade 0.       Assessment:  Prema Mike is a 48 year old patient with a diagnosis of AGC Pap test, hrHPV-.   Colposcopic impression normal/cervical polyp, cervical & endometrial sampling negative for pathology.   Cervical polyp pathology pending.     Abnormal uterine bleeding, likely due to adenomyosis and perimenopause, desires definitive management.         Plan:   1) AGC Pap test; abnormal uterine bleeding: I reviewed the pathology results and colposcopic findings with the patient and discussed recommended management. Discussed that while AGC cytology raises concern for endocervical or endometrial pathology, 60% reflects benign changes, which is represented on cervical pathology results (endometriosis; inflammation and reactive changes). Discussed finding of likely benign endometrial polyp (pathology pending), which could also be contributing to abnormal bleeding. Recommend continued cervical cancer screening. Prema has had irregular menses for 2 years, and strongly desires definitive management, especially in the setting of atypical glandular cells, and the fact that the endocervix can be more difficult to manage.     After discussion of risks/benefits of all options, Prema would like to proceed with hysterectomy.   Plan for Pelvic exam under anesthesia, total laparoscopic hysterectomy, bilateral salpingectomy.  -If endocervical polyp pathology shows HSIL/AIS: Plan for excision via CKC prior to hysterectomy to rule-out invasive cancer.     2) Genetic risk factors assessed: Does not meet criteria for Genetics referral.     3) Labs/tests ordered: Surgical pathology: Endocervical polyp.   Will notify patient of results via [employer].     4) Health maintenance issues addressed today: None.    5) Code status: Full-code.     6) Prescriptions: None.    7) Preoperative teaching was completed today.  Risks of the surgery were discussed, including but not limited to:  Bleeding, possibly requiring a blood  transfusion; injury to adjacent organs; postoperative complications (infection, possibly requiring antibiotics, drain placement, wound opening; blood clot and risk of pulmonary embolism); medical complications (pneumonia; stroke; heart attack; death); long-term complications (hernia; chronic pain).   The patient was counseled that in the case of hysterectomy, they will no longer be able to become pregnant and that hysterectomy is associated with an increased risk of pelvic organ prolapse and/or urinary incontinence.  The patient was counseled that trainees such as fellows, residents and/or students may be involved in her case. Each team member will perform to his/her level of training, and may perform a pelvic exam under anesthesia.  The patient will receive antibiotic and VTE prophylaxis as indicated.       A total of 45 minutes was spent with the patient, 35 minutes of which were spent in counseling/treatment planning, 8 minutes of which were spent in procedure time; an additional 10 minutes was spent in chart review (including review of labs) and documentation.         Vannesa Angeles MD, MS, FACOG, FACS  9/5/2024  4:28 PM

## 2024-09-05 ENCOUNTER — PRE VISIT (OUTPATIENT)
Dept: ONCOLOGY | Facility: CLINIC | Age: 49
End: 2024-09-05
Payer: COMMERCIAL

## 2024-09-05 ENCOUNTER — OFFICE VISIT (OUTPATIENT)
Dept: ONCOLOGY | Facility: CLINIC | Age: 49
End: 2024-09-05
Attending: OBSTETRICS & GYNECOLOGY
Payer: COMMERCIAL

## 2024-09-05 VITALS
OXYGEN SATURATION: 100 % | DIASTOLIC BLOOD PRESSURE: 84 MMHG | TEMPERATURE: 97.8 F | BODY MASS INDEX: 30.31 KG/M2 | WEIGHT: 195 LBS | SYSTOLIC BLOOD PRESSURE: 140 MMHG | HEART RATE: 75 BPM | RESPIRATION RATE: 16 BRPM

## 2024-09-05 DIAGNOSIS — N84.1 CERVICAL POLYP: ICD-10-CM

## 2024-09-05 DIAGNOSIS — N93.9 ABNORMAL UTERINE BLEEDING: ICD-10-CM

## 2024-09-05 DIAGNOSIS — R87.619 ATYPICAL GLANDULAR CELLS ON CERVICAL PAP SMEAR: Primary | ICD-10-CM

## 2024-09-05 PROCEDURE — 99204 OFFICE O/P NEW MOD 45 MIN: CPT | Mod: 25 | Performed by: OBSTETRICS & GYNECOLOGY

## 2024-09-05 PROCEDURE — 99213 OFFICE O/P EST LOW 20 MIN: CPT | Mod: 25 | Performed by: OBSTETRICS & GYNECOLOGY

## 2024-09-05 PROCEDURE — 88305 TISSUE EXAM BY PATHOLOGIST: CPT | Mod: TC | Performed by: OBSTETRICS & GYNECOLOGY

## 2024-09-05 PROCEDURE — 88305 TISSUE EXAM BY PATHOLOGIST: CPT | Mod: 26 | Performed by: PATHOLOGY

## 2024-09-05 PROCEDURE — 57455 BIOPSY OF CERVIX W/SCOPE: CPT | Performed by: OBSTETRICS & GYNECOLOGY

## 2024-09-05 RX ORDER — CEFAZOLIN SODIUM 2 G/50ML
2 SOLUTION INTRAVENOUS SEE ADMIN INSTRUCTIONS
OUTPATIENT
Start: 2024-09-05

## 2024-09-05 RX ORDER — CEFAZOLIN SODIUM 2 G/50ML
2 SOLUTION INTRAVENOUS
OUTPATIENT
Start: 2024-09-05

## 2024-09-05 RX ORDER — METRONIDAZOLE 500 MG/100ML
500 INJECTION, SOLUTION INTRAVENOUS
OUTPATIENT
Start: 2024-09-05

## 2024-09-05 RX ORDER — HEPARIN SODIUM 5000 [USP'U]/.5ML
5000 INJECTION, SOLUTION INTRAVENOUS; SUBCUTANEOUS
OUTPATIENT
Start: 2024-09-05

## 2024-09-05 ASSESSMENT — PAIN SCALES - GENERAL: PAINLEVEL: NO PAIN (0)

## 2024-09-05 NOTE — Clinical Note
Alex Tirado Thank you for referring Prema Mike to the gyn onc HPV clinic for evaluation of AGC cervical cytology. I agree with your colposcopic assessment, and just re-sampled the likely endocervical polyp, pathology pending. Prema strongly desires definitive hysterectomy, especially given a history of abnormal bleeding, and we are working on scheduling this. Please contact me if you have any questions or concerns.  --louis Angeles MD, MS, FACOG, FACS 9/7/2024 2:14 PM

## 2024-09-05 NOTE — LETTER
"2024      Prema Mike  [address]  [facility] [address]      Dear Colleague,    Thank you for referring your patient, Prema Mike, to the [facility]. Please see a copy of my visit note below.    Consult Note  [facility]  [facility]      Referring provider/Gynecologist:    Tiffanie Tirado MD  [address] [place] [address]          Patient: Prema Mike  : 1975  Language: English   Pronouns: she/her/hers       Date of Visit: Sep 5, 2024       Reason for visit: AGC Pap test , hrHPV-.       History of Present Illness:  Prema Mike is a 48 year old patient referred to the [facility] Gynecologic [facility] HPV Clinic for evaluation of AGC Pap test.  History as follows:    *HPV vaccination status: Unvaccinated.    Cervical Cancer Screening History:  : LEEP.   -Pathology: \"cervical dysplasia\" (per notes--pathology report not available for review).     07, 08, 9/30/10, 11, 13: Pap test NILM.     17, 20, 23: Pap test NILM, hrHPV-.     24: Pap test AGC-NOS, hrHPV-.   24: Colposcopy by gynecologist Dr. Tirado.  -Findings: SCJ not fully visuazlied. Polyp vs prominent ectropion noted.   -Pathology: Endocervical curettings, cervical biopsy 1:00, 6:00, 11:00 negative for dysplasia/malignancy, remarkable for endometriosis, inflammation and reactive changes; endometrial biopsy with inactive endometrium, negative for hyperplasia, atypia, malignancy.         Subjective:  Prema Mike presents to the Gyn Onc HPV Clinic today for a scheduled consultation, unaccompanied.   Prema reports she started having irregular bleeding ~2 years ago, then had no periods for 2 years, and now has had continuous bleeding. Endometrial sampling negative. She continues OCPs. She is interested in a hysterectomy.   No abnormal discharge.       Medical History:  Past Medical History: "   Diagnosis Date     Anxiety 2015     Cervical dysplasia 1993    LEEP     Depression     prozac/ cybalta/celexa/wellbutrin      Herpes simplex     oral HSV-1     Low HDL (under 40) 2008    HDL 44     Migraine highschool    with [employer].  Saw Dr. Hermes Ivory MD in [place].  He ordered MRI, and gave samples for Relpax.  Also referred her for myofascial pain management.  Responded well to myofascial pain management.     Myofascial pain     headaches     Spastic colon     had flex sig         Surgical History:  Past Surgical History:   Procedure Laterality Date     LEEP TX, CERVICAL      done elsewhere          Gynecologic History:  Menstrual/Menopause status: Perimenopausal.   Hormone therapy/Contraception status: OCPs.   History of STIS: None.  Current sexual activity status:  Rarely sexually active.         Obstetric History:  OB History    Para Term  AB Living   3 3 2 1 0 3   SAB IAB Ectopic Multiple Live Births   0 0 0 0 3      # Outcome Date GA Lbr Rylan/2nd Weight Sex Type Anes PTL Lv   3 Term 06/15/16 38w1d 03:05 / 00:11 3.17 kg (6 lb 15.8 oz) M Vag-Spont Local  SHANNAN      Apgar1: 8  Apgar5: 9   2 Term 07 38w0d  3.515 kg (7 lb 12 oz) F  EPI  SHANNAN      Birth Comments: SROM      Name: Kimi Cm  04 36w0d  2.977 kg (6 lb 9 oz) M  EPI  SHANNAN      Birth Comments: SROM.  PIH.  Club Foot.      Name: Mailen          Medications:   Current Outpatient Medications   Medication Sig Dispense Refill     augmented betamethasone dipropionate (DIPROLENE) 0.05 % external lotion APPLY 2 TIMES PER DAY TO SCALP AREAS FOR 2-3 WEEKS THEN SAT, SUN AND WED       drospirenone-ethinyl estradiol (ABIEL) 3-0.03 MG tablet Take 1 tablet by mouth daily . Active tabs only, skip placebo pills. Take continuously. 112 tablet 4     eletriptan (RELPAX) 40 MG tablet Take 1 tablet (40 mg) by mouth at onset of headache for migraine May repeat dose in 2 hours.  Do not exceed 80 mg in 24 hours 18  tablet 3     metroNIDAZOLE (METROCREAM) 0.75 % external cream APPLY TO PIMPLY BUMPS ON FACE TWICE A DAY       Multiple Vitamins-Minerals (MULTIVITAMIN PO)        sertraline (ZOLOFT) 100 MG tablet Take 1 tablet (100 mg) by mouth daily 90 tablet 3     valACYclovir (VALTREX) 500 MG tablet Take 1 tablet (500 mg) by mouth daily 90 tablet 3     No current facility-administered medications for this visit.          Allergies:   Allergies   Allergen Reactions     No Known Allergies           Social History:  Patient lives with her  and 3 schoolage kids.   Work status: Works for [employer]. Activity: No activity limitations.  Advanced Directives: None. Desired Healthcare Power of :  Fabian Mike.  Social History     Tobacco Use     Smoking status: Never     Smokeless tobacco: Never   Substance Use Topics     Alcohol use: Yes     Alcohol/week: 0.0 standard drinks of alcohol     Comment: 2 beers a week     Drug use: No          Family History:  Family history significant for a second-degree paternal relative with colon cancer, and a second-degree maternal relative with ovarian or uterine cancer.   No known family history of breast, urothelial/renal, prostate or pancreatic cancers.  No known family history of melanoma.  Family History   Problem Relation Age of Onset     Hyperlipidemia Mother      Hypertension Mother      Cervical Cancer Mother      Hyperlipidemia Father      Hyperlipidemia Sister      Cervical Cancer Sister      Hyperlipidemia Brother      Ovarian Cancer Maternal Grandmother         OVARIAN / UTERINE      Hyperlipidemia Maternal Grandmother      Coronary Artery Disease Maternal Grandfather      Hyperlipidemia Paternal Grandmother      Colon Cancer Paternal Grandfather      Diabetes Paternal Grandfather      Hyperlipidemia Paternal Grandfather      Hypertension Paternal Grandfather      Coronary Artery Disease Paternal Grandfather      Hyperlipidemia Other          Physical  Exam:   BP (!) 140/84 (BP Location: Right arm, Patient Position: Sitting, Cuff Size: Adult Regular)   Pulse 75   Temp 97.8  F (36.6  C) (Oral)   Resp 16   Wt 88.5 kg (195 lb)   LMP  (LMP Unknown)   SpO2 100%   BMI 30.31 kg/m    Body mass index is 30.31 kg/m .     General Appearance: healthy and alert, no distress     Musculoskeletal: extremities non tender and without edema    Skin: no lesions or rashes     Neurological: normal gait, no gross defects     Psychiatric: appropriate mood and affect                               Genitourinary: External genitalia and urethral meatus appears normal.  Vagina is normal in appearance. Cervix exam notable for polypoid tissue at the os, likely an endocervical polyp.   Verbal consent provided by the patient prior to pelvic exam.  Each step of the exam was verbalized throughout.  Present for the exam: ZBIGNIEW Coon.       Procedure Risk Statement:   The patient was counseled regarding risks, benefits and alternatives to colposcopy, possible biopsies.  Risks discussed include but not limited to:  Bleeding; infection; injury to adjacent organs; inadequate sample or false negative result.  The patient's questions were answered, and informed consent signed.       Procedure:   Colposcopy:  After the informed consent was signed and time-out procedure was performed, dilute acetic acid was applied to the cervix. Colposcopy performed.  -Squamocolumnar junction fully visualized? No  -Acetowhite changes? None  -Punctations, mosaicism, abnormal vasculature, other abnormalities? Polypoid tissue at the endocervix as noted above--no acetowhite changes.   -Clinical Impression: Normal/endocervical polyp  -Specimens: Endocervical polyp.    A biopsy of the endocervical polyp was taken using polyp forceps. The polyp could not be removed in its entirety. Hemostasis achieved with application of silver nitrate.       Laboratory Examination:  6/11/24 CBC: WBC 7.8, hemoglobin 13.0, platelets  232.  6/11/24 CMP: Creatinine 0.97. Electrolytes within normal limits. Albumin 4.1. Liver enzymes within normal limits.   6/11/24 Hemoglobin A1C: 5.0%.  I have independently reviewed & interpreted the cervical cancer screening & biopsy pathology results detailed in the HPI.       Performance Status:  ECOG Grade 0.       Assessment:  Prema Mike is a 48 year old patient with a diagnosis of AGC Pap test, hrHPV-.   Colposcopic impression normal/cervical polyp, cervical & endometrial sampling negative for pathology.   Cervical polyp pathology pending.     Abnormal uterine bleeding, likely due to adenomyosis and perimenopause, desires definitive management.         Plan:   1) AGC Pap test; abnormal uterine bleeding: I reviewed the pathology results and colposcopic findings with the patient and discussed recommended management. Discussed that while AGC cytology raises concern for endocervical or endometrial pathology, 60% reflects benign changes, which is represented on cervical pathology results (endometriosis; inflammation and reactive changes). Discussed finding of likely benign endometrial polyp (pathology pending), which could also be contributing to abnormal bleeding. Recommend continued cervical cancer screening. Prema has had irregular menses for 2 years, and strongly desires definitive management, especially in the setting of atypical glandular cells, and the fact that the endocervix can be more difficult to manage.     After discussion of risks/benefits of all options, Prema would like to proceed with hysterectomy.   Plan for Pelvic exam under anesthesia, total laparoscopic hysterectomy, bilateral salpingectomy.  -If endocervical polyp pathology shows HSIL/AIS: Plan for excision via CKC prior to hysterectomy to rule-out invasive cancer.     2) Genetic risk factors assessed: Does not meet criteria for Genetics referral.     3) Labs/tests ordered: Surgical pathology: Endocervical polyp.   Will notify  patient of results via mojio.     4) Health maintenance issues addressed today: None.    5) Code status: Full-code.     6) Prescriptions: None.    7) Preoperative teaching was completed today.  Risks of the surgery were discussed, including but not limited to:  Bleeding, possibly requiring a blood transfusion; injury to adjacent organs; postoperative complications (infection, possibly requiring antibiotics, drain placement, wound opening; blood clot and risk of pulmonary embolism); medical complications (pneumonia; stroke; heart attack; death); long-term complications (hernia; chronic pain).   The patient was counseled that in the case of hysterectomy, they will no longer be able to become pregnant and that hysterectomy is associated with an increased risk of pelvic organ prolapse and/or urinary incontinence.  The patient was counseled that trainees such as fellows, residents and/or students may be involved in her case. Each team member will perform to his/her level of training, and may perform a pelvic exam under anesthesia.  The patient will receive antibiotic and VTE prophylaxis as indicated.       A total of 45 minutes was spent with the patient, 35 minutes of which were spent in counseling/treatment planning, 8 minutes of which were spent in procedure time; an additional 10 minutes was spent in chart review (including review of labs) and documentation.         Vannesa Angeles MD, MS, FACOG, FACS  9/5/2024  4:28 PM            Again, thank you for allowing me to participate in the care of your patient.        Sincerely,        Vannesa Angeles MD

## 2024-09-05 NOTE — NURSING NOTE
"Oncology Rooming Note    September 5, 2024 3:33 PM   Prema Mike is a 48 year old female who presents for:    Chief Complaint   Patient presents with    Oncology Clinic Visit     Atypical glandular cells of undetermined significance (ELISA) on cervical Pap smear        Initial Vitals: BP (!) 140/84 (BP Location: Right arm, Patient Position: Sitting, Cuff Size: Adult Regular)   Pulse 75   Temp 97.8  F (36.6  C) (Oral)   Resp 16   Wt 88.5 kg (195 lb)   LMP  (LMP Unknown)   SpO2 100%   BMI 30.31 kg/m   Estimated body mass index is 30.31 kg/m  as calculated from the following:    Height as of 8/19/24: 1.708 m (5' 7.25\").    Weight as of this encounter: 88.5 kg (195 lb). Body surface area is 2.05 meters squared.  No Pain (0) Comment: Data Unavailable   No LMP recorded (lmp unknown).  Allergies reviewed: Yes  Medications reviewed: Yes    Medications: Medication refills not needed today.  Pharmacy name entered into [employer]: [id number] [address] [place]    Frailty Screening:   Is the patient here for a new oncology consult visit in cancer care? 1. Yes. Over the past month, have you experienced difficulty or required a caregiver to assist with:   1. Balance, walking or general mobility (including any falls)? NO  2. Completion of self-care tasks such as bathing, dressing, toileting, grooming/hygiene?  NO  3. Concentration or memory that affects your daily life?  YES       Clinical concerns: none       Irais Davenport"

## 2024-09-10 NOTE — NURSING NOTE
Pre Op Nurse Teaching Template    Relevant Diagnosis: lesley pap    Teaching Topic: Pelvic exam under anesthesia, total laparoscopic hysterectomy, bilateral salpingectomy.     Person(s) involved in teaching :  Patient    Motivation Level:  Asks Questions:    Yes      Eager to Learn:     Yes     Cooperative:          Yes    Receptive (willing. Able to accept information):    Yes      Patient and those who are listed above demonstrates understanding of the following:   Reason for the appointment, diagnosis and treatment plan:   Yes   Knowledge of proper use of medications and conditions for which they are ordered (with special attention to potential side effects or drug interactions): Yes   Which situations necessitate calling provider and whom to contact: Yes         Nutritional needs and diet plan:  Yes      Pain management techniques:     Yes, Pain Scale   Diet:   Yes    Teaching Concerns addressed: Yes    Infection Prevention:  Patient and those who are listed above demonstrate understanding of the following:  Pre-Op CHG Bathing Instructions: Yes  Surgical procedure site care taught:   Yes   Signs and symptoms of infection taught: Yes       Instructional Materials Used/Given:  The Before Your Surgery Booklet  Showering before Surgery instructions & CHG Product  Pain Assessment Tool   Home Care after Surgery  Eating after surgery  Map  Accommodations Brochure  Phone numbers for clinic and on call doctor  Copy of Surgical Consent    Done Today:    Preop Visit needed with PAC or PCP   Tests Ordered:  Post Op Visit Scheduled:TBD  Comments:    Surgery date/time:TBD    9/10/24 request sent to surgery scheduler via in basket pool      Consent signed via IPAD and on file in media  Hysterectomy consent signed and sent to scanning  .

## 2024-09-11 ENCOUNTER — TELEPHONE (OUTPATIENT)
Dept: ONCOLOGY | Facility: CLINIC | Age: 49
End: 2024-09-11
Payer: COMMERCIAL

## 2024-09-11 NOTE — TELEPHONE ENCOUNTER
[Patient requested mid/late October]    Called patient to schedule surgery with Dr. Angeles    Spoke with:  patient    Date of Surgery: 10/14/2024    Arrival time Discussed with Patient:  No    Location of surgery: [facility] OR     Pre-Op H&P: PAC, 10/8/24, 2:15 PM    Post-Op Appts: will be scheduled by clinic     Imaging:  No     Discussed with patient PAC RN will provide arrival time and instructions for surgery at the time of the appointment: [[facility] only]: Yes    Packet sent out: Yes  via Received in clinic by patient     Informed patient that they will need an adult  to bring patient home from surgery: Yes  : spouse       All patients questions were answered and patient was instructed to review surgical packet and call back with any questions or concerns.       Carlito Montenegro on 9/11/2024 at 10:48 AM

## 2024-09-11 NOTE — TELEPHONE ENCOUNTER
FUTURE VISIT INFORMATION      SURGERY INFORMATION:  Date: 10/14/24  Location:  or  Surgeon:  Vannesa Angeles MD   Anesthesia Type:  general  Procedure: Pelvic exam under anesthesia total laparoscopic hysterectomy, bilateral salpingectomy.   Consult: ov 9/5/24    RECORDS REQUESTED FROM:       Primary Care Provider: Leonor Govea [facility]

## 2024-09-17 ENCOUNTER — PATIENT OUTREACH (OUTPATIENT)
Dept: OBGYN | Facility: CLINIC | Age: 49
End: 2024-09-17
Payer: COMMERCIAL

## 2024-09-17 DIAGNOSIS — N87.9 CERVICAL DYSPLASIA: Primary | ICD-10-CM

## 2024-09-17 NOTE — TELEPHONE ENCOUNTER
"8/19/24: Colposcopy by gynecologist Dr. Tirado. Plan refer to Dysplasia Clinic  -Findings: SCJ not fully visualized. Polyp vs prominent ectropion noted.   -Pathology: Endocervical curettings, cervical biopsy 1:00, 6:00, 11:00 negative for dysplasia/malignancy, remarkable for endometriosis, inflammation and reactive changes; endometrial biopsy with inactive endometrium, negative for hyperplasia, atypia, malignancy.  9/5/24 [place] with Dr. Angeles/Gyn Onc.   Endocervix, \"polyp\", curettage:  - Unremarkable endocervical and ectocervical epithelium admixed with mucus and inflammatory cells  - Negative for dysplasia or malignancy. Plan hysterectomy  10/14/24 Hysterectomy    End tracking as pt is being followed by Gyn Onc  "

## 2024-10-07 LAB
ABO/RH(D): NORMAL
ANTIBODY SCREEN: NEGATIVE
SPECIMEN EXPIRATION DATE: NORMAL

## 2024-10-08 ENCOUNTER — PRE VISIT (OUTPATIENT)
Dept: SURGERY | Facility: CLINIC | Age: 49
End: 2024-10-08

## 2024-10-08 ENCOUNTER — ANESTHESIA EVENT (OUTPATIENT)
Dept: SURGERY | Facility: CLINIC | Age: 49
End: 2024-10-08
Payer: COMMERCIAL

## 2024-10-08 ENCOUNTER — LAB (OUTPATIENT)
Dept: LAB | Facility: CLINIC | Age: 49
End: 2024-10-08
Payer: COMMERCIAL

## 2024-10-08 ENCOUNTER — OFFICE VISIT (OUTPATIENT)
Dept: SURGERY | Facility: CLINIC | Age: 49
End: 2024-10-08
Payer: COMMERCIAL

## 2024-10-08 ENCOUNTER — APPOINTMENT (OUTPATIENT)
Dept: LAB | Facility: CLINIC | Age: 49
End: 2024-10-08
Payer: COMMERCIAL

## 2024-10-08 VITALS
HEART RATE: 69 BPM | TEMPERATURE: 97.9 F | SYSTOLIC BLOOD PRESSURE: 135 MMHG | BODY MASS INDEX: 30.92 KG/M2 | DIASTOLIC BLOOD PRESSURE: 87 MMHG | WEIGHT: 197 LBS | OXYGEN SATURATION: 99 % | RESPIRATION RATE: 16 BRPM | HEIGHT: 67 IN

## 2024-10-08 DIAGNOSIS — Z01.818 PRE-OP EVALUATION: ICD-10-CM

## 2024-10-08 DIAGNOSIS — Z01.818 PRE-OP EVALUATION: Primary | ICD-10-CM

## 2024-10-08 DIAGNOSIS — N93.9 ABNORMAL UTERINE BLEEDING: ICD-10-CM

## 2024-10-08 DIAGNOSIS — R87.619 ATYPICAL GLANDULAR CELLS ON CERVICAL PAP SMEAR: ICD-10-CM

## 2024-10-08 LAB — HCG UR QL: NEGATIVE

## 2024-10-08 PROCEDURE — 86900 BLOOD TYPING SEROLOGIC ABO: CPT

## 2024-10-08 PROCEDURE — 86850 RBC ANTIBODY SCREEN: CPT

## 2024-10-08 PROCEDURE — 99202 OFFICE O/P NEW SF 15 MIN: CPT | Performed by: PHYSICIAN ASSISTANT

## 2024-10-08 PROCEDURE — 36415 COLL VENOUS BLD VENIPUNCTURE: CPT | Performed by: PATHOLOGY

## 2024-10-08 ASSESSMENT — LIFESTYLE VARIABLES: TOBACCO_USE: 0

## 2024-10-08 ASSESSMENT — ENCOUNTER SYMPTOMS: SEIZURES: 0

## 2024-10-08 ASSESSMENT — PAIN SCALES - GENERAL: PAINLEVEL: NO PAIN (0)

## 2024-10-08 NOTE — H&P
Pre-Operative H & P     CC:  Preoperative exam to assess for increased cardiopulmonary risk while undergoing surgery and anesthesia.    Date of Encounter: 10/8/2024  Primary Care Physician:  No Ref-Primary, Physician     Reason for visit:   Encounter Diagnoses   Name Primary?    Pre-op evaluation Yes    Abnormal uterine bleeding     Atypical glandular cells on cervical Pap smear        MARI Mike is a 48 year old female who presents for pre-operative H & P in preparation for  Procedure Information       Case: [id number] Date/Time: 10/14/24 1030    Procedures:       Pelvic exam under anesthesia (Vagina)      total laparoscopic hysterectomy, bilateral salpingectomy. (Bilateral: Abdomen)    Anesthesia type: General    Diagnosis:       Atypical glandular cells on cervical Pap smear [R87.619]      Abnormal uterine bleeding [N93.9]    Pre-op diagnosis:       Atypical glandular cells on cervical Pap smear [R87.619]      Abnormal uterine bleeding [N93.9]    Location:  OR [facility] /  OR    Providers: Vannesa Angeles MD            Patient is being evaluated for comorbid conditions of migraines, anxiety, and depression.    She was seen in June for her annual GYN exam and mammogram and reported abnormal uterine bleeding. A pap smear was done and results showed AGC. A subsequent colposcopy was completed and showed evidence of endometriosis but was otherwise negative for hyperplasia, atypia, and malignancy. She was referred to Dr. Angeles to discuss further treatment options. She is now scheduled for surgical intervention as above.     History is obtained from the patient and chart review    Hx of abnormal bleeding or anti-platelet use: Denies    Menstrual history: No LMP recorded (lmp unknown).     Past Medical History  Past Medical History:   Diagnosis Date    Anxiety 07/07/2015    Cervical dysplasia 01/01/1993    LEEP    Depression     prozac/ cybalta/celexa/wellbutrin     Herpes simplex     oral HSV-1    Low HDL  (under 40) 09/01/2008    HDL 44    Migraine highschool    with Aura.  Saw Dr. Hermes Ivory MD in [place].  He ordered MRI, and gave samples for Relpax.  Also referred her for myofascial pain management.  Responded well to myofascial pain management.    Myofascial pain     headaches    Spastic colon     had flex sig       Past Surgical History  Past Surgical History:   Procedure Laterality Date    LEEP TX, CERVICAL      done elsewhere    TOOTH EXTRACTION      [place] teeth       Prior to Admission Medications  Current Outpatient Medications   Medication Sig Dispense Refill    augmented betamethasone dipropionate (DIPROLENE) 0.05 % external lotion APPLY 2 TIMES PER DAY TO SCALP AREAS FOR 2-3 WEEKS THEN SAT, SUN AND WED      drospirenone-ethinyl estradiol (ABIEL) 3-0.03 MG tablet Take 1 tablet by mouth daily . Active tabs only, skip placebo pills. Take continuously. (Patient taking differently: Take 1 tablet by mouth every evening. . Active tabs only, skip placebo pills. Take continuously.) 112 tablet 4    eletriptan (RELPAX) 40 MG tablet Take 1 tablet (40 mg) by mouth at onset of headache for migraine May repeat dose in 2 hours.  Do not exceed 80 mg in 24 hours 18 tablet 3    metroNIDAZOLE (METROCREAM) 0.75 % external cream Apply topically 2 times daily.      Multiple Vitamins-Minerals (MULTIVITAMIN PO) Take by mouth daily.      sertraline (ZOLOFT) 100 MG tablet Take 1 tablet (100 mg) by mouth daily (Patient taking differently: Take 100 mg by mouth every morning.) 90 tablet 3    valACYclovir (VALTREX) 500 MG tablet Take 1 tablet (500 mg) by mouth daily (Patient taking differently: Take 500 mg by mouth as needed.) 90 tablet 3       Allergies  Allergies   Allergen Reactions    No Known Allergies        Social History  Social History     Socioeconomic History    Marital status:      Spouse name: Fabian    Number of children: 2    Years of education: Not on file    Highest education level: Not on file    Occupational History    Not on file   Tobacco Use    Smoking status: Never    Smokeless tobacco: Never   Substance and Sexual Activity    Alcohol use: Yes     Alcohol/week: 0.0 standard drinks of alcohol     Comment: 2 beers a week    Drug use: No    Sexual activity: Yes     Partners: Male     Birth control/protection: Condom   Other Topics Concern     Service Not Asked    Blood Transfusions Not Asked    Caffeine Concern Not Asked    Occupational Exposure Not Asked    Hobby Hazards Not Asked    Sleep Concern Not Asked    Stress Concern Not Asked    Weight Concern Not Asked    Special Diet Not Asked    Back Care Not Asked    Exercise Yes     Comment: 1-3times weekly    Bike Helmet Not Asked    Seat Belt Not Asked    Self-Exams Not Asked    Parent/sibling w/ CABG, MI or angioplasty before 65F 55M? Not Asked   Social History Narrative    Depression notes from [employer]: First had depression symptoms in college ([id number]).  Was on 60mg Fluoxetine, then switched to 40mg of Citalopram.  One year later (1/10) had recurrent symptoms of moodiness, weight gain, fatigue, acne, increasing migraines so added 150mg Wellbutrin XL.  Cymbalta 2011.     Social Determinants of Health     Financial Resource Strain: Not on file   Food Insecurity: Not on file   Transportation Needs: Not on file   Physical Activity: Not on file   Stress: Not on file   Social Connections: Not on file   Interpersonal Safety: Not on file   Housing Stability: Not on file       Family History  Family History   Problem Relation Age of Onset    Hyperlipidemia Mother     Hypertension Mother     Cervical Cancer Mother     Hyperlipidemia Father     Hyperlipidemia Sister     Cervical Cancer Sister     Hyperlipidemia Brother     Ovarian Cancer Maternal Grandmother 60    Hyperlipidemia Maternal Grandmother     Coronary Artery Disease Maternal Grandfather     Deep Vein Thrombosis (DVT) Maternal Grandfather     Hyperlipidemia Paternal Grandmother     Colon Cancer  "Paternal Grandfather 80    Diabetes Paternal Grandfather     Hyperlipidemia Paternal Grandfather     Hypertension Paternal Grandfather     Coronary Artery Disease Paternal Grandfather     Hyperlipidemia Other     Anesthesia Reaction No family hx of        Review of Systems  The complete review of systems is negative other than noted in the HPI or here.   Anesthesia Evaluation   Pt has had prior anesthetic.     No history of anesthetic complications       ROS/MED HX  ENT/Pulmonary:     (+)     CHARMAINE risk factors, snores loudly,                               (-) tobacco use, asthma and recent URI   Neurologic:     (+)      migraines,                       (-) no seizures, no CVA and no TIA   Cardiovascular: Comment: Denies chest pain/pressure, PETIT, orthopnea, dizziness, palpitations, edema.  - neg cardiovascular ROS     METS/Exercise Tolerance: >4 METS Comment: Able to walk a couple miles continuously and ascend stairs without issue   Hematologic:  - neg hematologic  ROS     Musculoskeletal:  - neg musculoskeletal ROS     GI/Hepatic: Comment: IBS   (-) GERD and liver disease   Renal/Genitourinary: Comment: Atypical glandular cells on cervical Pap smear  Abnormal uterine bleeding   (-) renal disease   Endo:     (+)               Obesity,    (-) Type II DM and thyroid disease   Psychiatric/Substance Use:     (+) psychiatric history anxiety and depression       Infectious Disease:  - neg infectious disease ROS     Malignancy:  - neg malignancy ROS     Other:            /87 (BP Location: Right arm, Patient Position: Sitting, Cuff Size: Adult Regular)   Pulse 69   Temp 97.9  F (36.6  C) (Oral)   Resp 16   Ht 1.708 m (5' 7.25\")   Wt 89.4 kg (197 lb)   LMP  (LMP Unknown)   SpO2 99%   Breastfeeding No   BMI 30.63 kg/m      Physical Exam   Constitutional: Pleasant, well-appearing, no apparent distress, and appears stated age.  Eyes: Pupils equal, round and reactive to light, extra ocular muscles intact, sclera " clear, conjunctiva normal.  HENT: Normocephalic and atraumatic, oral pharynx with moist mucus membranes, good dentition. No goiter appreciated.   Respiratory: Clear to auscultation bilaterally, no crackles or wheezing.  Cardiovascular: Regular rate and rhythm, normal S1 and S2, and no murmur noted.  Carotids +2, no bruits. No edema. Palpable pulses to radial  DP and PT arteries.   GI: Normal bowel sounds, soft, non-distended, non-tender, no masses palpated, no hepatosplenomegaly.  Lymph/Hematologic: No cervical lymphadenopathy and no supraclavicular lymphadenopathy.  Genitourinary:  Deferred  Skin: Warm and dry.  No rashes on exposed skin   Musculoskeletal: Full ROM of neck. There is no redness, warmth, or swelling of the visible joints. Gross motor strength is normal.    Neurologic: Awake, alert, oriented to name, place and time. Cranial nerves II-XII are grossly intact. Gait is normal.   Neuropsychiatric: Calm, cooperative. Normal affect.       Prior Labs/Diagnostic Studies   All labs and imaging personally reviewed     EKG/ stress test - if available please see in ROS above   No results found.    The patient's records and results personally reviewed by this provider.     Outside records reviewed from: Care Everywhere    LAB/DIAGNOSTIC STUDIES TODAY:  T&S    Assessment  Prema Mike is a 48 year old female seen as a PAC referral for risk assessment and optimization for anesthesia.    Plan/Recommendations  Pt will be optimized for the proposed procedure.  See below for details on the assessment, risk, and preoperative recommendations    NEUROLOGY  - No history of TIA, CVA or seizure  - Migraines  Hold eletriptan 24 hours prior to surgery    -Post Op delirium risk factors:  No risk identified    ENT  - No current airway concerns.  Will need to be reassessed day of surgery.  Mallampati: II  TM: > 3    CARDIAC  - No history of CAD, Hypertension, and Afib  - METS (Metabolic Equivalents)  Patient performs 4 or more  "METS exercise without symptoms             Total Score: 0      RCRI-Very low risk: Class 1 0.4% complication rate             Total Score: 0        PULMONARY    CHARMAINE Low Risk             Total Score: 1    CHARMAINE: Snores loudly      - Denies asthma or inhaler use  - Tobacco History    History   Smoking Status    Never   Smokeless Tobacco    Never       GI    PONV High Risk  Total Score: 3           1 AN PONV: Pt is Female    1 AN PONV: Patient is not a current smoker    1 AN PONV: Intended Post Op Opioids        /RENAL  - Abnormal uterine bleeding, atypical glandular cells on cervical pap smear  Above surgery planned for further management  - Baseline Creatinine  0.97    ENDOCRINE    - BMI: Estimated body mass index is 30.63 kg/m  as calculated from the following:    Height as of this encounter: 1.708 m (5' 7.25\").    Weight as of this encounter: 89.4 kg (197 lb).  Obesity (BMI >30)  - No history of Diabetes Mellitus    HEME  VTE Low Risk 0.5%             Total Score: 4    VTE: Family Hx of VTE      - No history of abnormal bleeding or antiplatelet use.  - A type and screen has been ordered for this patient  - CBC WNL on 6/11/2024  MSK  Patient is NOT Frail             Total Score: 0        PSYCH  - Anxiety, Depression  Continue mental health medications without interruption    Different anesthesia methods/types have been discussed with the patient, but they are aware that the final plan will be decided by the assigned anesthesia provider on the date of service.    The patient is optimized for their procedure. AVS with information on surgery time/arrival time, meds and NPO status given by nursing staff. No further diagnostic testing indicated.      On the day of service:     Prep time: 10 minutes  Visit time: 10 minutes  Documentation time: 5 minutes  ------------------------------------------  Total time: 25 minutes      Tiffanie Clark PA-C  Preoperative Assessment Center  [facility]  Clinic and " Surgery Center  Phone: [phone]  Fax: [phone]

## 2024-10-08 NOTE — PATIENT INSTRUCTIONS
Name:  Prema Mike   MRN:  [id number]   :  1975   Today's Date:  10/8/2024         You were seen today for a pre-operative assessment in the:    Pre-operative Anesthesia Assessment Center(PAC)  [facility]  [address] [place] [address]  phone [phone]      You will be receiving a call with location, date, arrival time and diet instructions from Preadmission Nursing at your surgical site:    -[facility]: [phone]   -[facility]: [phone]  -[facility]: [phone]    -[facility]: [phone]    -[facility]: [phone]  -[facility]: [phone]  -[facility]: [phone]  -[facility]: [phone]        Anesthesia recommendations for medications:    Hold Aspirin for 7 days before procedure.  Hold Multivitamins for 7 days before procedure.   Hold Herbal medications and Supplements for 7 days before procedure.  Hold Ibuprofen for 1 day before procedure.   Hold Naproxen for 4 days before procedure.     Hold Elitriptan for 24 hours before surgery       No alcohol or cannabis products for 24 hours before your procedure        Please take these medications the day of procedure:  Sertraline   Valacyclovir           How do I prepare myself?  - Please take 2 showers (one the night prior to surgery and one the morning of surgery) using Scrubcare or Hibiclens soap.    Use this soap only from the neck to your toes.     Leave the soap on your skin for one minute--then rinse thoroughly.      You may use your own shampoo and conditioner. No other hair products.   - Please remove all jewelry and body piercings.  - No lotions, deodorants or fragrance.  - No makeup or fingernail polish.   - Bring your ID and insurance card.    -If you have a Deep Brain Stimulator, a Spinal Cord Stimulator, or any implanted Neuro Device, you must bring the remote to your appointment                 For further  questions regarding your surgery please call your surgeon's office.

## 2024-10-09 LAB
BLOOD BANK CHART COMMENT: NORMAL
SPECIMEN EXPIRATION DATE: NORMAL

## 2024-10-13 NOTE — ANESTHESIA PREPROCEDURE EVALUATION
Anesthesia Pre-Procedure Evaluation    Patient: Prema Mike   MRN: [id number] : 1975        Procedure : Procedure(s):  Pelvic exam under anesthesia  total laparoscopic hysterectomy, bilateral salpingectomy.          Past Medical History:   Diagnosis Date    Anxiety 2015    Cervical dysplasia 1993    LEEP    Depression     prozac/ cybalta/celexa/wellbutrin     Herpes simplex     oral HSV-1    Low HDL (under 40) 2008    HDL 44    Migraine highschool    with Aura.  Saw Dr. Hermes Ivory MD in [place].  He ordered MRI, and gave samples for Relpax.  Also referred her for myofascial pain management.  Responded well to myofascial pain management.    Myofascial pain     headaches    Spastic colon     had flex sig      Past Surgical History:   Procedure Laterality Date    LEEP TX, CERVICAL      done elsewhere    TOOTH EXTRACTION      [place] teeth      Allergies   Allergen Reactions    No Known Allergies       Social History     Tobacco Use    Smoking status: Never    Smokeless tobacco: Never   Substance Use Topics    Alcohol use: Yes     Alcohol/week: 0.0 standard drinks of alcohol     Comment: 2 beers a week      Wt Readings from Last 1 Encounters:   10/08/24 89.4 kg (197 lb)        Anesthesia Evaluation   Pt has had prior anesthetic.     No history of anesthetic complications (some awareness during her wisdom teeth which I explained is considered normal)       ROS/MED HX  ENT/Pulmonary:     (+)     CHARMAINE risk factors, snores loudly,                                  Neurologic:     (+)      migraines,                          Cardiovascular: Comment: Denies chest pain/pressure, PETIT, orthopnea, dizziness, palpitations, edema.       METS/Exercise Tolerance: >4 METS Comment: Able to walk a couple miles continuously and ascend stairs without issue   Hematologic:       Musculoskeletal:       GI/Hepatic: Comment: IBS   (-) GERD   Renal/Genitourinary: Comment: Atypical glandular cells on cervical  "Pap smear  Abnormal uterine bleeding      Endo:     (+)               Obesity,       Psychiatric/Substance Use:     (+) psychiatric history anxiety and depression       Infectious Disease:       Malignancy:       Other:            Physical Exam    Airway        Mallampati: II   TM distance: > 3 FB   Neck ROM: full   Mouth opening: > 3 cm    Respiratory Devices and Support         Dental       (+) Minor Abnormalities - some fillings, tiny chips      Cardiovascular   cardiovascular exam normal          Pulmonary   pulmonary exam normal                OUTSIDE LABS:  CBC:   Lab Results   Component Value Date    WBC 7.8 06/11/2024    WBC 8.4 06/27/2023    HGB 13.0 06/11/2024    HGB 12.4 06/27/2023    HCT 38.4 06/11/2024    HCT 35.7 06/27/2023     06/11/2024     06/27/2023     BMP:   Lab Results   Component Value Date     06/11/2024     04/27/2023    POTASSIUM 4.1 06/11/2024    POTASSIUM 4.8 04/27/2023    CHLORIDE 110 (H) 06/11/2024    CHLORIDE 106 04/27/2023    CO2 21 (L) 06/11/2024    CO2 23 04/27/2023    BUN 7.7 06/11/2024    BUN 12.0 04/27/2023    CR 0.97 (H) 06/11/2024    CR 1.01 (H) 04/27/2023    GLC 88 06/11/2024    GLC 96 04/27/2023     COAGS: No results found for: \"PTT\", \"INR\", \"FIBR\"  POC:   Lab Results   Component Value Date    HCG Negative 10/08/2024     HEPATIC:   Lab Results   Component Value Date    ALBUMIN 4.1 06/11/2024    PROTTOTAL 6.6 06/11/2024    ALT 16 06/11/2024    AST 22 06/11/2024    ALKPHOS 61 06/11/2024    BILITOTAL 0.3 06/11/2024     OTHER:   Lab Results   Component Value Date    A1C 5.0 06/11/2024    MARIA DEL CARMEN 8.9 06/11/2024    TSH 2.59 06/11/2024       Anesthesia Plan    ASA Status:  3    NPO Status:  NPO Appropriate    Anesthesia Type: General.     - Airway: ETT   Induction: Intravenous.   Maintenance: Balanced.   Techniques and Equipment:     - Airway: Video-Laryngoscope       Consents    Anesthesia Plan(s) and associated risks, benefits, and realistic alternatives " "discussed. Questions answered and patient/representative(s) expressed understanding.     - Discussed:     - Discussed with:  Patient            Postoperative Care    Pain management: IV analgesics, Multi-modal analgesia.   PONV prophylaxis: Ondansetron (or other 5HT-3), Dexamethasone or Solumedrol, Background Propofol Infusion     Comments:    Other Comments: Emend 40mg preop           Jorgito Albrecht MD    I have reviewed the pertinent notes and labs in the chart from the past 30 days and (re)examined the patient.  Any updates or changes from those notes are reflected in this note.                       # Obesity: Estimated body mass index is 30.63 kg/m  as calculated from the following:    Height as of 10/8/24: 1.708 m (5' 7.25\").    Weight as of 10/8/24: 89.4 kg (197 lb).             "

## 2024-10-14 ENCOUNTER — HOSPITAL ENCOUNTER (OUTPATIENT)
Facility: CLINIC | Age: 49
Discharge: HOME | End: 2024-10-14
Attending: OBSTETRICS & GYNECOLOGY | Admitting: OBSTETRICS & GYNECOLOGY
Payer: COMMERCIAL

## 2024-10-14 ENCOUNTER — TELEPHONE (OUTPATIENT)
Dept: ONCOLOGY | Facility: CLINIC | Age: 49
End: 2024-10-14

## 2024-10-14 ENCOUNTER — ANESTHESIA (OUTPATIENT)
Dept: SURGERY | Facility: CLINIC | Age: 49
End: 2024-10-14
Payer: COMMERCIAL

## 2024-10-14 VITALS
HEIGHT: 67 IN | HEART RATE: 62 BPM | DIASTOLIC BLOOD PRESSURE: 75 MMHG | BODY MASS INDEX: 30.92 KG/M2 | TEMPERATURE: 97.5 F | OXYGEN SATURATION: 96 % | WEIGHT: 197 LBS | RESPIRATION RATE: 16 BRPM | SYSTOLIC BLOOD PRESSURE: 124 MMHG

## 2024-10-14 DIAGNOSIS — G89.18 POSTOPERATIVE PAIN: ICD-10-CM

## 2024-10-14 DIAGNOSIS — N93.9 ABNORMAL UTERINE BLEEDING: ICD-10-CM

## 2024-10-14 DIAGNOSIS — B00.9 HERPES SIMPLEX: ICD-10-CM

## 2024-10-14 DIAGNOSIS — R87.619 ATYPICAL GLANDULAR CELLS ON CERVICAL PAP SMEAR: ICD-10-CM

## 2024-10-14 DIAGNOSIS — Z90.710 S/P HYSTERECTOMY: Primary | ICD-10-CM

## 2024-10-14 LAB
ABO/RH(D): NORMAL
ANTIBODY SCREEN: NEGATIVE
HCG SERPL QL: NEGATIVE
SPECIMEN EXPIRATION DATE: NORMAL

## 2024-10-14 PROCEDURE — 250N000009 HC RX 250: Performed by: OBSTETRICS & GYNECOLOGY

## 2024-10-14 PROCEDURE — 86850 RBC ANTIBODY SCREEN: CPT | Performed by: OBSTETRICS & GYNECOLOGY

## 2024-10-14 PROCEDURE — 250N000011 HC RX IP 250 OP 636: Performed by: NURSE ANESTHETIST, CERTIFIED REGISTERED

## 2024-10-14 PROCEDURE — 58571 TLH W/T/O 250 G OR LESS: CPT | Performed by: ANESTHESIOLOGY

## 2024-10-14 PROCEDURE — 250N000011 HC RX IP 250 OP 636: Performed by: ANESTHESIOLOGY

## 2024-10-14 PROCEDURE — 999N000141 HC STATISTIC PRE-PROCEDURE NURSING ASSESSMENT: Performed by: OBSTETRICS & GYNECOLOGY

## 2024-10-14 PROCEDURE — 250N000009 HC RX 250: Performed by: NURSE ANESTHETIST, CERTIFIED REGISTERED

## 2024-10-14 PROCEDURE — 250N000025 HC SEVOFLURANE, PER MIN: Performed by: OBSTETRICS & GYNECOLOGY

## 2024-10-14 PROCEDURE — P9045 ALBUMIN (HUMAN), 5%, 250 ML: HCPCS | Performed by: NURSE ANESTHETIST, CERTIFIED REGISTERED

## 2024-10-14 PROCEDURE — 710N000009 HC RECOVERY PHASE 1, LEVEL 1, PER MIN: Performed by: OBSTETRICS & GYNECOLOGY

## 2024-10-14 PROCEDURE — 36415 COLL VENOUS BLD VENIPUNCTURE: CPT | Performed by: OBSTETRICS & GYNECOLOGY

## 2024-10-14 PROCEDURE — 360N000077 HC SURGERY LEVEL 4, PER MIN: Performed by: OBSTETRICS & GYNECOLOGY

## 2024-10-14 PROCEDURE — 84703 CHORIONIC GONADOTROPIN ASSAY: CPT | Performed by: ANESTHESIOLOGY

## 2024-10-14 PROCEDURE — 710N000012 HC RECOVERY PHASE 2, PER MINUTE: Performed by: OBSTETRICS & GYNECOLOGY

## 2024-10-14 PROCEDURE — 250N000013 HC RX MED GY IP 250 OP 250 PS 637

## 2024-10-14 PROCEDURE — 88307 TISSUE EXAM BY PATHOLOGIST: CPT | Mod: TC | Performed by: OBSTETRICS & GYNECOLOGY

## 2024-10-14 PROCEDURE — 258N000003 HC RX IP 258 OP 636: Performed by: NURSE ANESTHETIST, CERTIFIED REGISTERED

## 2024-10-14 PROCEDURE — 250N000011 HC RX IP 250 OP 636: Mod: JZ | Performed by: OBSTETRICS & GYNECOLOGY

## 2024-10-14 PROCEDURE — 86900 BLOOD TYPING SEROLOGIC ABO: CPT | Performed by: OBSTETRICS & GYNECOLOGY

## 2024-10-14 PROCEDURE — 58571 TLH W/T/O 250 G OR LESS: CPT | Performed by: NURSE ANESTHETIST, CERTIFIED REGISTERED

## 2024-10-14 PROCEDURE — 272N000001 HC OR GENERAL SUPPLY STERILE: Performed by: OBSTETRICS & GYNECOLOGY

## 2024-10-14 PROCEDURE — 370N000017 HC ANESTHESIA TECHNICAL FEE, PER MIN: Performed by: OBSTETRICS & GYNECOLOGY

## 2024-10-14 RX ORDER — HYDROXYZINE HYDROCHLORIDE 25 MG/1
50 TABLET, FILM COATED ORAL
Status: DISCONTINUED | OUTPATIENT
Start: 2024-10-14 | End: 2024-10-14 | Stop reason: HOSPADM

## 2024-10-14 RX ORDER — CEFAZOLIN SODIUM/WATER 2 G/20 ML
2 SYRINGE (ML) INTRAVENOUS SEE ADMIN INSTRUCTIONS
Status: DISCONTINUED | OUTPATIENT
Start: 2024-10-14 | End: 2024-10-14 | Stop reason: HOSPADM

## 2024-10-14 RX ORDER — FENTANYL CITRATE 50 UG/ML
50 INJECTION, SOLUTION INTRAMUSCULAR; INTRAVENOUS
Status: DISCONTINUED | OUTPATIENT
Start: 2024-10-14 | End: 2024-10-14 | Stop reason: HOSPADM

## 2024-10-14 RX ORDER — LIDOCAINE HYDROCHLORIDE 20 MG/ML
INJECTION, SOLUTION INFILTRATION; PERINEURAL PRN
Status: DISCONTINUED | OUTPATIENT
Start: 2024-10-14 | End: 2024-10-14

## 2024-10-14 RX ORDER — SODIUM CHLORIDE, SODIUM LACTATE, POTASSIUM CHLORIDE, CALCIUM CHLORIDE 600; 310; 30; 20 MG/100ML; MG/100ML; MG/100ML; MG/100ML
INJECTION, SOLUTION INTRAVENOUS CONTINUOUS PRN
Status: DISCONTINUED | OUTPATIENT
Start: 2024-10-14 | End: 2024-10-14

## 2024-10-14 RX ORDER — FENTANYL CITRATE 0.05 MG/ML
50 INJECTION, SOLUTION INTRAMUSCULAR; INTRAVENOUS EVERY 5 MIN PRN
Status: DISCONTINUED | OUTPATIENT
Start: 2024-10-14 | End: 2024-10-14 | Stop reason: HOSPADM

## 2024-10-14 RX ORDER — DEXAMETHASONE SODIUM PHOSPHATE 4 MG/ML
INJECTION, SOLUTION INTRA-ARTICULAR; INTRALESIONAL; INTRAMUSCULAR; INTRAVENOUS; SOFT TISSUE PRN
Status: DISCONTINUED | OUTPATIENT
Start: 2024-10-14 | End: 2024-10-14

## 2024-10-14 RX ORDER — KETOROLAC TROMETHAMINE 30 MG/ML
INJECTION, SOLUTION INTRAMUSCULAR; INTRAVENOUS PRN
Status: DISCONTINUED | OUTPATIENT
Start: 2024-10-14 | End: 2024-10-14

## 2024-10-14 RX ORDER — FENTANYL CITRATE 50 UG/ML
INJECTION, SOLUTION INTRAMUSCULAR; INTRAVENOUS PRN
Status: DISCONTINUED | OUTPATIENT
Start: 2024-10-14 | End: 2024-10-14

## 2024-10-14 RX ORDER — ACETAMINOPHEN 325 MG/1
650 TABLET ORAL EVERY 6 HOURS PRN
Qty: 60 TABLET | Refills: 0 | Status: SHIPPED | OUTPATIENT
Start: 2024-10-14

## 2024-10-14 RX ORDER — MAGNESIUM HYDROXIDE 1200 MG/15ML
LIQUID ORAL PRN
Status: DISCONTINUED | OUTPATIENT
Start: 2024-10-14 | End: 2024-10-14 | Stop reason: HOSPADM

## 2024-10-14 RX ORDER — NALOXONE HYDROCHLORIDE 0.4 MG/ML
0.1 INJECTION, SOLUTION INTRAMUSCULAR; INTRAVENOUS; SUBCUTANEOUS
Status: DISCONTINUED | OUTPATIENT
Start: 2024-10-14 | End: 2024-10-14 | Stop reason: HOSPADM

## 2024-10-14 RX ORDER — APREPITANT 40 MG/1
40 CAPSULE ORAL ONCE
Status: COMPLETED | OUTPATIENT
Start: 2024-10-14 | End: 2024-10-14

## 2024-10-14 RX ORDER — SODIUM CHLORIDE, SODIUM LACTATE, POTASSIUM CHLORIDE, CALCIUM CHLORIDE 600; 310; 30; 20 MG/100ML; MG/100ML; MG/100ML; MG/100ML
INJECTION, SOLUTION INTRAVENOUS CONTINUOUS
Status: DISCONTINUED | OUTPATIENT
Start: 2024-10-14 | End: 2024-10-14 | Stop reason: HOSPADM

## 2024-10-14 RX ORDER — ONDANSETRON 2 MG/ML
INJECTION INTRAMUSCULAR; INTRAVENOUS PRN
Status: DISCONTINUED | OUTPATIENT
Start: 2024-10-14 | End: 2024-10-14

## 2024-10-14 RX ORDER — OXYCODONE HYDROCHLORIDE 5 MG/1
5 TABLET ORAL EVERY 6 HOURS PRN
Qty: 12 TABLET | Refills: 0 | Status: CANCELLED | OUTPATIENT
Start: 2024-10-14 | End: 2024-10-17

## 2024-10-14 RX ORDER — BUPIVACAINE HYDROCHLORIDE 2.5 MG/ML
INJECTION, SOLUTION EPIDURAL; INFILTRATION; INTRACAUDAL PRN
Status: DISCONTINUED | OUTPATIENT
Start: 2024-10-14 | End: 2024-10-14 | Stop reason: HOSPADM

## 2024-10-14 RX ORDER — OXYCODONE HYDROCHLORIDE 5 MG/1
5 TABLET ORAL EVERY 6 HOURS PRN
Qty: 6 TABLET | Refills: 0 | Status: SHIPPED | OUTPATIENT
Start: 2024-10-14 | End: 2024-10-17

## 2024-10-14 RX ORDER — HEPARIN SODIUM 5000 [USP'U]/.5ML
5000 INJECTION, SOLUTION INTRAVENOUS; SUBCUTANEOUS
Status: COMPLETED | OUTPATIENT
Start: 2024-10-14 | End: 2024-10-14

## 2024-10-14 RX ORDER — PROPOFOL 10 MG/ML
INJECTION, EMULSION INTRAVENOUS CONTINUOUS PRN
Status: DISCONTINUED | OUTPATIENT
Start: 2024-10-14 | End: 2024-10-14

## 2024-10-14 RX ORDER — HYDROMORPHONE HCL IN WATER/PF 6 MG/30 ML
0.2 PATIENT CONTROLLED ANALGESIA SYRINGE INTRAVENOUS EVERY 5 MIN PRN
Status: DISCONTINUED | OUTPATIENT
Start: 2024-10-14 | End: 2024-10-14 | Stop reason: HOSPADM

## 2024-10-14 RX ORDER — OXYCODONE HYDROCHLORIDE 5 MG/1
5-10 TABLET ORAL EVERY 4 HOURS PRN
Status: COMPLETED | OUTPATIENT
Start: 2024-10-14 | End: 2024-10-14

## 2024-10-14 RX ORDER — METRONIDAZOLE 500 MG/100ML
500 INJECTION, SOLUTION INTRAVENOUS
Status: COMPLETED | OUTPATIENT
Start: 2024-10-14 | End: 2024-10-14

## 2024-10-14 RX ORDER — AMOXICILLIN 250 MG
1-2 CAPSULE ORAL 2 TIMES DAILY
Qty: 60 TABLET | Refills: 0 | Status: SHIPPED | OUTPATIENT
Start: 2024-10-14

## 2024-10-14 RX ORDER — HYDROMORPHONE HCL IN WATER/PF 6 MG/30 ML
0.4 PATIENT CONTROLLED ANALGESIA SYRINGE INTRAVENOUS EVERY 5 MIN PRN
Status: DISCONTINUED | OUTPATIENT
Start: 2024-10-14 | End: 2024-10-14 | Stop reason: HOSPADM

## 2024-10-14 RX ORDER — ONDANSETRON 2 MG/ML
4 INJECTION INTRAMUSCULAR; INTRAVENOUS EVERY 30 MIN PRN
Status: DISCONTINUED | OUTPATIENT
Start: 2024-10-14 | End: 2024-10-14 | Stop reason: HOSPADM

## 2024-10-14 RX ORDER — FENTANYL CITRATE 0.05 MG/ML
25 INJECTION, SOLUTION INTRAMUSCULAR; INTRAVENOUS EVERY 5 MIN PRN
Status: DISCONTINUED | OUTPATIENT
Start: 2024-10-14 | End: 2024-10-14 | Stop reason: HOSPADM

## 2024-10-14 RX ORDER — CEFAZOLIN SODIUM/WATER 2 G/20 ML
2 SYRINGE (ML) INTRAVENOUS
Status: DISCONTINUED | OUTPATIENT
Start: 2024-10-14 | End: 2024-10-14 | Stop reason: HOSPADM

## 2024-10-14 RX ORDER — IBUPROFEN 600 MG/1
600 TABLET, FILM COATED ORAL EVERY 6 HOURS PRN
Qty: 60 TABLET | Refills: 0 | Status: SHIPPED | OUTPATIENT
Start: 2024-10-14

## 2024-10-14 RX ORDER — PROPOFOL 10 MG/ML
INJECTION, EMULSION INTRAVENOUS PRN
Status: DISCONTINUED | OUTPATIENT
Start: 2024-10-14 | End: 2024-10-14

## 2024-10-14 RX ORDER — ONDANSETRON 4 MG/1
4 TABLET, ORALLY DISINTEGRATING ORAL EVERY 30 MIN PRN
Status: DISCONTINUED | OUTPATIENT
Start: 2024-10-14 | End: 2024-10-14 | Stop reason: HOSPADM

## 2024-10-14 RX ORDER — DEXAMETHASONE SODIUM PHOSPHATE 4 MG/ML
4 INJECTION, SOLUTION INTRA-ARTICULAR; INTRALESIONAL; INTRAMUSCULAR; INTRAVENOUS; SOFT TISSUE
Status: DISCONTINUED | OUTPATIENT
Start: 2024-10-14 | End: 2024-10-14 | Stop reason: HOSPADM

## 2024-10-14 RX ADMIN — METRONIDAZOLE 500 MG: 500 INJECTION, SOLUTION INTRAVENOUS at 09:28

## 2024-10-14 RX ADMIN — Medication 200 MG: at 11:34

## 2024-10-14 RX ADMIN — LIDOCAINE HYDROCHLORIDE 80 MG: 20 INJECTION, SOLUTION INFILTRATION; PERINEURAL at 10:18

## 2024-10-14 RX ADMIN — ALBUMIN (HUMAN): 12.5 SOLUTION INTRAVENOUS at 10:28

## 2024-10-14 RX ADMIN — FENTANYL CITRATE 50 MCG: 50 INJECTION, SOLUTION INTRAMUSCULAR; INTRAVENOUS at 12:33

## 2024-10-14 RX ADMIN — MIDAZOLAM 2 MG: 1 INJECTION INTRAMUSCULAR; INTRAVENOUS at 10:11

## 2024-10-14 RX ADMIN — ROCURONIUM BROMIDE 50 MG: 50 INJECTION, SOLUTION INTRAVENOUS at 10:20

## 2024-10-14 RX ADMIN — PROPOFOL 100 MG: 10 INJECTION, EMULSION INTRAVENOUS at 10:22

## 2024-10-14 RX ADMIN — PROPOFOL 150 MCG/KG/MIN: 10 INJECTION, EMULSION INTRAVENOUS at 10:22

## 2024-10-14 RX ADMIN — SODIUM CHLORIDE, POTASSIUM CHLORIDE, SODIUM LACTATE AND CALCIUM CHLORIDE: 600; 310; 30; 20 INJECTION, SOLUTION INTRAVENOUS at 10:18

## 2024-10-14 RX ADMIN — PROPOFOL 200 MG: 10 INJECTION, EMULSION INTRAVENOUS at 10:19

## 2024-10-14 RX ADMIN — ONDANSETRON 4 MG: 2 INJECTION INTRAMUSCULAR; INTRAVENOUS at 11:29

## 2024-10-14 RX ADMIN — HEPARIN SODIUM 5000 UNITS: 5000 INJECTION, SOLUTION INTRAVENOUS; SUBCUTANEOUS at 09:23

## 2024-10-14 RX ADMIN — KETOROLAC TROMETHAMINE 30 MG: 30 INJECTION, SOLUTION INTRAMUSCULAR at 11:29

## 2024-10-14 RX ADMIN — FENTANYL CITRATE 50 MCG: 50 INJECTION, SOLUTION INTRAMUSCULAR; INTRAVENOUS at 13:19

## 2024-10-14 RX ADMIN — DEXAMETHASONE SODIUM PHOSPHATE 4 MG: 4 INJECTION, SOLUTION INTRA-ARTICULAR; INTRALESIONAL; INTRAMUSCULAR; INTRAVENOUS; SOFT TISSUE at 10:27

## 2024-10-14 RX ADMIN — ROCURONIUM BROMIDE 20 MG: 50 INJECTION, SOLUTION INTRAVENOUS at 10:46

## 2024-10-14 RX ADMIN — FENTANYL CITRATE 50 MCG: 50 INJECTION INTRAMUSCULAR; INTRAVENOUS at 10:11

## 2024-10-14 RX ADMIN — HYDROMORPHONE HYDROCHLORIDE 0.5 MG: 1 INJECTION, SOLUTION INTRAMUSCULAR; INTRAVENOUS; SUBCUTANEOUS at 10:47

## 2024-10-14 RX ADMIN — FENTANYL CITRATE 50 MCG: 50 INJECTION, SOLUTION INTRAMUSCULAR; INTRAVENOUS at 12:23

## 2024-10-14 RX ADMIN — APREPITANT 40 MG: 40 CAPSULE ORAL at 09:19

## 2024-10-14 RX ADMIN — FENTANYL CITRATE 50 MCG: 50 INJECTION INTRAMUSCULAR; INTRAVENOUS at 10:45

## 2024-10-14 RX ADMIN — OXYCODONE HYDROCHLORIDE 5 MG: 5 TABLET ORAL at 12:59

## 2024-10-14 ASSESSMENT — ACTIVITIES OF DAILY LIVING (ADL)
ADLS_ACUITY_SCORE: 37

## 2024-10-14 NOTE — ANESTHESIA CARE TRANSFER NOTE
Patient: Prema Mike    Procedure: Procedure(s):  Pelvic exam under anesthesia  total laparoscopic hysterectomy, bilateral salpingectomy.       Diagnosis: Atypical glandular cells on cervical Pap smear [R87.619]  Abnormal uterine bleeding [N93.9]  Diagnosis Additional Information: No value filed.    Anesthesia Type:   General     Note:    Oropharynx: oropharynx clear of all foreign objects  Level of Consciousness: awake  Oxygen Supplementation: face mask  Level of Supplemental Oxygen (L/min / FiO2): 6  Independent Airway: airway patency satisfactory and stable  Dentition: dentition unchanged  Vital Signs Stable: post-procedure vital signs reviewed and stable  Report to RN Given: handoff report given  Patient transferred to: PACU    Handoff Report: Identifed the Patient, Identified the Reponsible Provider, Reviewed the pertinent medical history, Discussed the surgical course, Reviewed Intra-OP anesthesia mangement and issues during anesthesia, Set expectations for post-procedure period and Allowed opportunity for questions and acknowledgement of understanding      Vitals:  Vitals Value Taken Time   BP 90/55 10/14/24 1149   Temp     Pulse 69 10/14/24 1151   Resp 22 10/14/24 1151   SpO2 99    Vitals shown include unfiled device data.    Electronically Signed By: NEO Lucas CRNA  October 14, 2024  11:52 AM

## 2024-10-14 NOTE — OP NOTE
[facility] Operative Note    Pre-operative diagnosis: Atypical glandular cells on cervical cytology[R87.619];   Abnormal uterine bleeding [N93.9]   Post-operative diagnosis Atypical glandular cells on cervical cytology[R87.619];   Abnormal uterine bleeding [N93.9]     Procedure: Procedure(s):  Pelvic exam under anesthesia  total laparoscopic hysterectomy, bilateral salpingectomy.     Surgeon: Vannesa Angeles MD   Assistants(s): Angeles Rich MD (PGY3)     Estimated blood loss: 30 mL      Specimens: Uterus, cervix, bilateral fallopian tubes.     Findings: On pelvic exam under anesthesia, the cervix and vagina were normal, and the uterus was small and mobile. On laparoscopic examination the uterus was slightly enlarged, but grossly normal. Bilateral ovaries & fallopian tubes were grossly normal, and bilateral ovaries were left in situ. Remainder of pelvic/abdominal survey was normal.            Procedure:   The patient was taken to the operating room where general endotracheal anesthesia was administered and found to be adequate. She was placed in the dorsal lithotomy position, and a pelvic exam under anesthesia was performed with the findings as noted above. She was then prepped and draped in the normal sterile fashion. A marie catheter was placed in the bladder. The VCare uterine manipulator was then placed in the usual fashion.    A 12 mm balloon trocar was placed above the umbilicus using the open technique. The laparoscope was placed to confirm correct placement of the trocar, and CO2 gas was insufflated to a pressure of 15 mmHg. Bilateral lower quadrant 5 mm ports and a suprapubic 11 mm port were placed under direct visualization with the laparoscope. The patient was then placed into steep trendelenberg position, and the bowel was displaced into the upper abdomen.   The bilateral round ligaments were then cauterized and divided. The retroperitoneal space was opened bilaterally for adequate visualization  of the bilateral infundibulopelvic ligaments and ureters. The bilateral mesosalpinges were cauterized and divided using the Ligasure Device,  the ovaries from the fallopian tubes. The bilateral utero-ovarian ligaments were cauterized and divided using the Ligasure device, leaving the ovaries in situ. The peritoneal incision was then extended toward the uterus, and a bladder flap was created, taking the bladder flap down off the lower uterine segment and cervix. The bilateral uterine arteries were then skeletonized, cauterized and divided down to the level of the cervicovaginal junction. A colpotomy incision was then made using electrocautery around the rim of the L2Care uterine manipulator, amputating the cervix from the vagina. The entire specimen was then delivered through the vagina. A pneumoballoon was placed in the vagina to maintain pneumoperitoneum.    The vaginal cuff was closed with a running continuous stitch of 0-polysorb suture using an Endostitch device, with care taken to include the cuff angles as well as the vaginal mucosa. The vaginal cuff, all vascular pedicles and dissection sites were observed to be hemostatic. Hemostasis was maintained when the intraperitoneal pressure was decreased. The bilateral ureters were visualized retroperitoneally, and found to be vermiculating normally, and without evidence of injury. 20 mL of 0.25% bupivacaine was instilled in the intraperitoneally cavity.     The fascia at the suprapubic and supraumbilical port sites were closed with a figure-of-eight stitch of 0-vicryl using the Payam Aleah device. The remaining ports were then removed. The subcutaneous tissue at the 4 port sites was irrigated. The skin at all 4 port sites was closed with exofin glue. Local anesthetic was administered pre-peritoneally at all 4 port sites.    The patient tolerated the procedure well. Sponge, instrument and needle count was correct at the end of the procedure. The patient  was extubated in the operating room and taken in stable condition to the PACU.        Vannesa Angeles MD, MS, FACOG, FACS  10/14/2024  11:44 AM  g

## 2024-10-14 NOTE — ANESTHESIA POSTPROCEDURE EVALUATION
Patient: Prema Mike    Procedure: Procedure(s):  Pelvic exam under anesthesia  total laparoscopic hysterectomy, bilateral salpingectomy.       Anesthesia Type:  General    Note:     Postop Pain Control: Uneventful            Sign Out: Well controlled pain   PONV: No   Neuro/Psych: Uneventful            Sign Out: Acceptable/Baseline neuro status   Airway/Respiratory: Uneventful            Sign Out: Acceptable/Baseline resp. status   CV/Hemodynamics: Uneventful            Sign Out: Acceptable CV status; No obvious hypovolemia; No obvious fluid overload   Other NRE: NONE   DID A NON-ROUTINE EVENT OCCUR? No           Last vitals:  Vitals Value Taken Time   /64 10/14/24 1245   Temp 36  C (96.8  F) 10/14/24 1255   Pulse 61 10/14/24 1256   Resp 12 10/14/24 1256   SpO2 94 % 10/14/24 1256   Vitals shown include unfiled device data.    Electronically Signed By: Jorgito Albrecht MD  October 14, 2024  1:43 PM

## 2024-10-14 NOTE — TELEPHONE ENCOUNTER
Called Prema's  Fabian per Prema's request. Reviewed the intraoperative findings and procedure performed. Plan for discharge to home today when meeting PACU criteria.       Vannesa Angeles MD, MS, FACOG, FACS  10/14/2024  11:45 AM

## 2024-10-14 NOTE — DISCHARGE INSTRUCTIONS
Today you received Toradol, an antiinflammatory medication similar to Ibuprofen.  You should not take other antiinflammatory medication, such as Ibuprofen, Motrin, Advil, Aleve, Naprosyn, etc until 5:30 PM.     Same Day Surgery Discharge Instructions for  Sedation and General Anesthesia     It's not unusual to feel dizzy, light-headed or faint for up to 24 hours after surgery or while taking pain medication.  If you have these symptoms: sit for a few minutes before standing and have someone assist you when you get up to walk or use the bathroom.    You should rest and relax for the next 24 hours. We recommend you make arrangements to have an adult stay with you for at least 24 hours after your discharge.  Avoid hazardous and strenuous activity.    DO NOT DRIVE any vehicle or operate mechanical equipment for 24 hours following the end of your surgery.  Even though you may feel normal, your reactions may be affected by the medication you have received.    Do not drink alcoholic beverages for 24 hours following surgery.     Slowly progress to your regular diet as you feel able. It's not unusual to feel nauseated and/or vomit after receiving anesthesia.  If you develop these symptoms, drink clear liquids (apple juice, ginger ale, broth, 7-up, etc. ) until you feel better.  If your nausea and vomiting persists for 24 hours, please notify your surgeon.      All narcotic pain medications, along with inactivity and anesthesia, can cause constipation. Drinking plenty of liquids and increasing fiber intake will help.    For any questions of a medical nature, call your surgeon.    Do not make important decisions for 24 hours.    If you had general anesthesia, you may have a sore throat for a couple of days related to the breathing tube used during surgery.  You may use Cepacol lozenges to help with this discomfort.  If it worsens or if you develop a fever, contact your surgeon.     If you feel your pain is not well managed  with the pain medications prescribed by your surgeon, please contact your surgeon's office to let them know so they can address your concerns.          POSTOP INSTRUCTIONS:  You will be discharged with ibuprofen 600 mg, which should be taken every 6 hours for the first few days after surgery, then wean off as tolerated.  You can alternate ibuprofen with acetaminophen (tylenol), with a maximum of 4g in 1 day.   You will also be discharged with a narcotic pain medication (e.g. oxycodone). This can be used in addition to ibuprofen if needed; approximately 50% need to take a few narcotic pain tablets, 50% do not need any narcotics. Wean off this medication first. Common side effects include itching, nausea, constipation, fatigue.      Take a stool softener such as senna or colace twice daily after surgery, unless you develop diarrhea.      Decreased appetite after surgery is normal. Often 6-8 very small meals per day is better tolerated than 2-3 large meals. Decreased appetite can last for up to 4-6 months after surgery.     I encourage you to walk and take the stairs. No heavy lifting: Do not lift >10 pounds for the first 4 weeks, then no more than 20 pounds for the next 4 weeks, then can increase lifting as tolerated.     Swelling of the legs/buttocks is normal for 4-6 weeks. It will eventually decrease. Please call if you have increasing pain, especially in one leg more than the other, or if one leg is significantly more swollen than the other.     You may shower the day after surgery, just let the soap and water run over the incision(s) and pat dry. No sitting in a bathtub for 4 weeks.     No driving while taking narcotic pain medications. After you discontinue the narcotic, sit in your car and make sure you can move your foot from the gas to the break without flinching prior to driving (~1-4 weeks after surgery).      If hysterectomy performed: you may have some vaginal spotting as the top of the vagina heals, and  this is normal. If you have heavy bleeding like a moderate to heavy period, or have large gushes of fluid, please call the clinic.     Please call if you have fevers 101 or greater, heavy vaginal bleeding (see above), persistent nausea/vomiting, purulent discharge and/or increasing redness at the incision sites, or other problems or concerns.   You should feel a little bit better every day; call if you start developing more pain, or more nausea.        Activity Restrictions:  -No heavy lifting (>10-20 pounds) for 8 weeks after surgery.  -If hysterectomy performed: Nothing in the vagina (no tampons, no douching, no sex) for 8 weeks after surgery.  -No driving while taking narcotic pain medications.  After you discontinue the narcotic, sit in your car and make sure you can move your foot from the gas to the brake without flinching prior to driving (approximately 1 week after surgery).        Please call with any questions or concerns. [phone]

## 2024-10-14 NOTE — ANESTHESIA PROCEDURE NOTES
Airway       Patient location during procedure: OR       Procedure Start/Stop Times: 10/14/2024 10:22 AM  Staff -        CRNA: Ingris Granados APRN CRNA       Performed By: CRNA  Consent for Airway        Urgency: elective  Indications and Patient Condition       Indications for airway management: chiki-procedural       Induction type:intravenous       Mask difficulty assessment: 1 - vent by mask    Final Airway Details       Final airway type: endotracheal airway       Successful airway: ETT - single and Oral  Endotracheal Airway Details        ETT size (mm): 7.0       Cuffed: yes       Successful intubation technique: video laryngoscopy       VL Blade Size: Glidescope 3       Grade View of Cords: 1       Adjucts: stylet       Position: Right       Measured from: gums/teeth       Secured at (cm): 21       Bite block used: None    Post intubation assessment        Placement verified by: capnometry, equal breath sounds and chest rise        Number of attempts at approach: 1       Secured with: tape       Ease of procedure: easy       Dentition: Intact and Unchanged    Medication(s) Administered   Medication Administration Time: 10/14/2024 10:22 AM

## 2024-10-14 NOTE — LETTER
[facility] PAC[facility]  [address] [place] [address]  Phone: [phone]    October 14, 2024        Prema Mike  [address] [facility] [address]          To whom it may concern:    RE: Prema Mike    Patient may return to work in 4 weeks on 11/11/2024 with the following:  Light duty-unable to lift more than 15 pounds    Please contact me for questions or concerns.      Sincerely,      Vannesa Angeles MD

## 2024-10-16 PROCEDURE — 88307 TISSUE EXAM BY PATHOLOGIST: CPT | Mod: 26 | Performed by: PATHOLOGY

## 2024-10-16 NOTE — RESULT ENCOUNTER NOTE
I have personally reviewed the pathology results which support a diagnosis of no residual cervical HSIL.        Vannesa Angeles MD, MS, FACOG, FACS  10/16/2024  4:16 PM

## 2024-10-24 ENCOUNTER — OFFICE VISIT (OUTPATIENT)
Dept: ONCOLOGY | Facility: CLINIC | Age: 49
End: 2024-10-24
Attending: OBSTETRICS & GYNECOLOGY
Payer: COMMERCIAL

## 2024-10-24 VITALS
BODY MASS INDEX: 30.32 KG/M2 | OXYGEN SATURATION: 98 % | WEIGHT: 193.2 LBS | HEIGHT: 67 IN | HEART RATE: 63 BPM | RESPIRATION RATE: 16 BRPM | DIASTOLIC BLOOD PRESSURE: 94 MMHG | TEMPERATURE: 98.1 F | SYSTOLIC BLOOD PRESSURE: 146 MMHG

## 2024-10-24 DIAGNOSIS — R87.619 ATYPICAL GLANDULAR CELLS ON CERVICAL PAP SMEAR: Primary | ICD-10-CM

## 2024-10-24 PROCEDURE — 99213 OFFICE O/P EST LOW 20 MIN: CPT | Performed by: OBSTETRICS & GYNECOLOGY

## 2024-10-24 PROCEDURE — 99213 OFFICE O/P EST LOW 20 MIN: CPT | Mod: 24 | Performed by: OBSTETRICS & GYNECOLOGY

## 2024-10-24 ASSESSMENT — PAIN SCALES - GENERAL: PAINLEVEL_OUTOF10: NO PAIN (0)

## 2024-10-24 NOTE — LETTER
"10/24/2024      Prema Mike  [address]  [facility] [address]      Dear Colleague,    Thank you for referring your patient, Prema Mike, to the [facility]. Please see a copy of my visit note below.    Follow-up Note  [facility]  [facility]      Referring provider/Gynecologist:    Tiffanie Tirado MD  [address] [address]          Patient: Prema Mike  : 1975  Language: English   Pronouns: she/her/hers       Date of Visit: Oct 24, 2024       Reason for visit: AGC Pap test , hrHPV-.       History of Present Illness:  Prema Mike is a 49 year old patient referred to the [facility] for evaluation of AGC Pap test.  History as follows:    *HPV vaccination status: Unvaccinated.    Cervical Cancer Screening History:  : LEEP.   -Pathology: \"cervical dysplasia\" (per notes--pathology report not available for review).     07, 08, 9/30/10, 11, 13: Pap test NILM.     17, 20, 23: Pap test NILM, hrHPV-.     24: Pap test AGC-NOS, hrHPV-.   24: Colposcopy by gynecologist Dr. Tirado.  -Findings: SCJ not fully visuazlied. Polyp vs prominent ectropion noted.   -Pathology: Endocervical curettings, cervical biopsy 1:00, 6:00, 11:00 negative for dysplasia/malignancy, remarkable for endometriosis, inflammation and reactive changes; endometrial biopsy with inactive endometrium, negative for hyperplasia, atypia, malignancy.   24: Endocervical \"polyp\" pathology endocervical & ectocervical epithelium without dysplasia/malignancy.   10/14/24: Pelvic exam under anesthesia  total laparoscopic hysterectomy, bilateral salpingectomy.  -Pathology: Benign uterus with adenomyosis, cervix negative for dysplasia/malignancy.        Subjective:  Prema Mike presents to the Gyn Onc [facility] Clinic today for a scheduled follow-up visit, unaccompanied.  Recovering well " postoperatively. Pain well-controlled. Normal appetite. Normal bowel/bladder habits. No vaginal bleeding. No incisional erythema or drainage.        Medical History:  Past Medical History:   Diagnosis Date     Anxiety 2015     Cervical dysplasia 1993    LEEP     Depression     prozac/ cybalta/celexa/wellbutrin      Herpes simplex     oral HSV-1     Low HDL (under 40) 2008    HDL 44     Migraine highschool    with [employer].  Saw Dr. Hermes Ivory MD in [place].  He ordered MRI, and gave samples for Relpax.  Also referred her for myofascial pain management.  Responded well to myofascial pain management.     Myofascial pain     headaches     Spastic colon     had flex sig         Surgical History:  Past Surgical History:   Procedure Laterality Date     EXAM UNDER ANESTHESIA PELVIC N/A 10/14/2024    Procedure: Pelvic exam under anesthesia;  Surgeon: Vannesa Angeles MD;  Location: [facility] OR     LAPAROSCOPIC HYSTERECTOMY TOTAL, SALPINGECTOMY BILATERAL Bilateral 10/14/2024    Procedure: total laparoscopic hysterectomy, bilateral salpingectomy.;  Surgeon: Vannesa Angeles MD;  Location: [facility] OR     LEEP TX, CERVICAL      done elsewhere     TOOTH EXTRACTION      [place] teeth          Gynecologic History:  Menstrual/Menopause status: Perimenopausal.   Hormone therapy/Contraception status: OCPs.   History of STIS: None.  Current sexual activity status:  Rarely sexually active.         Obstetric History:  OB History    Para Term  AB Living   3 3 2 1 0 3   SAB IAB Ectopic Multiple Live Births   0 0 0 0 3      # Outcome Date GA Lbr Rylan/2nd Weight Sex Type Anes PTL Lv   3 Term 06/15/16 38w1d 03:05 / 00:11 3.17 kg (6 lb 15.8 oz) M Vag-Spont Local  SHANNAN      Apgar1: 8  Apgar5: 9   2 Term 07 38w0d  3.515 kg (7 lb 12 oz) F  EPI  SHANNAN      Birth Comments: SROM      Name: Kimi Cm  04 36w0d  2.977 kg (6 lb 9 oz) M  EPI  SHANNAN      Birth Comments: SROM.  PIH.  Club Foot.       Name: Susi          Medications:   Current Outpatient Medications   Medication Sig Dispense Refill     acetaminophen (TYLENOL) 325 MG tablet Take 2 tablets (650 mg) by mouth every 6 hours as needed for mild pain or fever. 60 tablet 0     augmented betamethasone dipropionate (DIPROLENE) 0.05 % external lotion APPLY 2 TIMES PER DAY TO SCALP AREAS FOR 2-3 WEEKS THEN SAT, SUN AND WED       eletriptan (RELPAX) 40 MG tablet Take 1 tablet (40 mg) by mouth at onset of headache for migraine May repeat dose in 2 hours.  Do not exceed 80 mg in 24 hours 18 tablet 3     metroNIDAZOLE (METROCREAM) 0.75 % external cream Apply topically 2 times daily.       Multiple Vitamins-Minerals (MULTIVITAMIN PO) Take by mouth daily.       sertraline (ZOLOFT) 100 MG tablet Take 1 tablet (100 mg) by mouth daily 90 tablet 3     ibuprofen (ADVIL/MOTRIN) 600 MG tablet Take 1 tablet (600 mg) by mouth every 6 hours as needed for other (mild and/or inflammatory pain.). (Patient not taking: Reported on 10/24/2024) 60 tablet 0     senna-docusate (SENOKOT-S/PERICOLACE) 8.6-50 MG tablet Take 1-2 tablets by mouth 2 times daily. (Patient not taking: Reported on 10/24/2024) 60 tablet 0     No current facility-administered medications for this visit.          Allergies:   Allergies   Allergen Reactions     No Known Allergies           Social History:  Patient lives with her  and 3 schoolage kids.   Work status: Works for [employer] via[employer]. Activity: No activity limitations.  Advanced Directives: None. Desired Healthcare Power of :  Fabian Mike.  Social History     Tobacco Use     Smoking status: Never     Smokeless tobacco: Never   Substance Use Topics     Alcohol use: Yes     Alcohol/week: 0.0 standard drinks of alcohol     Comment: 2 beers a week     Drug use: No          Family History:  Family history significant for a second-degree paternal relative with colon cancer, and a second-degree maternal relative with ovarian or  "uterine cancer.   No known family history of breast, urothelial/renal, prostate or pancreatic cancers.  No known family history of melanoma.  Family History   Problem Relation Age of Onset     Hyperlipidemia Mother      Hypertension Mother      Cervical Cancer Mother      Hyperlipidemia Father      Hyperlipidemia Sister      Cervical Cancer Sister      Hyperlipidemia Brother      Ovarian Cancer Maternal Grandmother 60     Hyperlipidemia Maternal Grandmother      Coronary Artery Disease Maternal Grandfather      Deep Vein Thrombosis (DVT) Maternal Grandfather      Hyperlipidemia Paternal Grandmother      Colon Cancer Paternal Grandfather 80     Diabetes Paternal Grandfather      Hyperlipidemia Paternal Grandfather      Hypertension Paternal Grandfather      Coronary Artery Disease Paternal Grandfather      Hyperlipidemia Other      Anesthesia Reaction No family hx of          Physical Exam:   BP (!) 146/94   Pulse 63   Temp 98.1  F (36.7  C) (Temporal)   Resp 16   Ht 1.702 m (5' 7.01\")   Wt 87.6 kg (193 lb 3.2 oz)   SpO2 98%   BMI 30.25 kg/m    Body mass index is 30.25 kg/m .     General Appearance: healthy and alert, no distress     Musculoskeletal: extremities without edema    Skin: no lesions or rashes     Neurological: normal gait, no gross defects     Psychiatric: appropriate mood and affect                               Gastrointestinal:       abdomen soft, non-tender, non-distended. Laparoscopic incisions x4 clean, dry and intact, no erythema, drainage or others signs of infection.         Laboratory Examination:  I have independently reviewed the 10/14/24 pathology results detailed in the HPI.       Performance Status:  ECOG Grade 0.       Assessment:  Prema Mike is a 49 year old patient with a diagnosis of AGC Pap test, hrHPV- and abnormal uterine bleeding now s/p definitive hysterectomy with benign pathology, no dysplasia/malignancy.   Recovering well postoperatively.       Plan:   1) AGC Pap " test; abnormal uterine bleeding:I reviewed the pathology results with Prema and provided her with a copy of the pathology report; also previously provided her with a copy of the pathology report via [employer].     Given the benign/negative pathology, no additional treatment indicated.   Since LEEP for presumed cervical HSIL >25 years ago, postop vaginal cancer screening no longer indicated.     2) Genetic risk factors assessed: Does not meet criteria for Genetics referral.     3) Labs/tests ordered: None.    4) Health maintenance issues addressed today: None.    5) Code status: Full-code.     6) Prescriptions: None.      A total of 15 minutes was spent with the patient, 14 minutes of which were spent in counseling/treatment planning, 1 minutes of which were spent in postoperative care/counseling; an additional 5 minutes was spent in chart review (including review of labs) and documentation.       Vannesa Angeles MD, MS, FACOG, FACS  10/24/2024  3:41 PM              Again, thank you for allowing me to participate in the care of your patient.        Sincerely,        Vannesa Angeles MD

## 2024-10-24 NOTE — PROGRESS NOTES
"Follow-up Note  [facility]  [facility]      Referring provider/Gynecologist:    Tiffanie Tirado MD  [address] [address]          Patient: Prema Mike  : 1975  Language: English   Pronouns: she/her/hers       Date of Visit: Oct 24, 2024       Reason for visit: AGC Pap test , hrHPV-.       History of Present Illness:  Prema Mike is a 49 year old patient referred to the [facility] for evaluation of AGC Pap test.  History as follows:    *HPV vaccination status: Unvaccinated.    Cervical Cancer Screening History:  : LEEP.   -Pathology: \"cervical dysplasia\" (per notes--pathology report not available for review).     07, 08, 9/30/10, 11, 13: Pap test NILM.     17, 20, 23: Pap test NILM, hrHPV-.     24: Pap test AGC-NOS, hrHPV-.   24: Colposcopy by gynecologist Dr. Tirado.  -Findings: SCJ not fully visuazlied. Polyp vs prominent ectropion noted.   -Pathology: Endocervical curettings, cervical biopsy 1:00, 6:00, 11:00 negative for dysplasia/malignancy, remarkable for endometriosis, inflammation and reactive changes; endometrial biopsy with inactive endometrium, negative for hyperplasia, atypia, malignancy.   24: Endocervical \"polyp\" pathology endocervical & ectocervical epithelium without dysplasia/malignancy.   10/14/24: Pelvic exam under anesthesia  total laparoscopic hysterectomy, bilateral salpingectomy.  -Pathology: Benign uterus with adenomyosis, cervix negative for dysplasia/malignancy.        Subjective:  Prema Mike presents to the Gyn Onc HPV Clinic today for a scheduled follow-up visit, unaccompanied.  Recovering well postoperatively. Pain well-controlled. Normal appetite. Normal bowel/bladder habits. No vaginal bleeding. No incisional erythema or drainage.        Medical History:  Past Medical History:   Diagnosis Date    Anxiety 2015    Cervical " dysplasia 1993    LEEP    Depression     prozac/ cybalta/celexa/wellbutrin     Herpes simplex     oral HSV-1    Low HDL (under 40) 2008    HDL 44    Migraine highschool    with [employer].  Saw Dr. Hermes Ivory MD in [place].  He ordered MRI, and gave samples for Relpax.  Also referred her for myofascial pain management.  Responded well to myofascial pain management.    Myofascial pain     headaches    Spastic colon     had flex sig         Surgical History:  Past Surgical History:   Procedure Laterality Date    EXAM UNDER ANESTHESIA PELVIC N/A 10/14/2024    Procedure: Pelvic exam under anesthesia;  Surgeon: Vannesa Angeles MD;  Location: [facility] OR    LAPAROSCOPIC HYSTERECTOMY TOTAL, SALPINGECTOMY BILATERAL Bilateral 10/14/2024    Procedure: total laparoscopic hysterectomy, bilateral salpingectomy.;  Surgeon: Vannesa Angeles MD;  Location: [facility] OR    LEEP TX, CERVICAL      done elsewhere    TOOTH EXTRACTION      [place] teeth          Gynecologic History:  Menstrual/Menopause status: Perimenopausal.   Hormone therapy/Contraception status: OCPs.   History of STIS: None.  Current sexual activity status:  Rarely sexually active.         Obstetric History:  OB History    Para Term  AB Living   3 3 2 1 0 3   SAB IAB Ectopic Multiple Live Births   0 0 0 0 3      # Outcome Date GA Lbr Rylan/2nd Weight Sex Type Anes PTL Lv   3 Term 06/15/ 38w1d 03:05 / 00:11 3.17 kg (6 lb 15.8 oz) M Vag-Spont Local  SHANNAN      Apgar1: 8  Apgar5: 9   2 Term 07 38w0d  3.515 kg (7 lb 12 oz) F  EPI  SHANNAN      Birth Comments: SROM      Name: Kimi   1  04 36w0d  2.977 kg (6 lb 9 oz) M  EPI  SHANNAN      Birth Comments: SROM.  PIH.  Club Foot.      Name: Mailen          Medications:   Current Outpatient Medications   Medication Sig Dispense Refill    acetaminophen (TYLENOL) 325 MG tablet Take 2 tablets (650 mg) by mouth every 6 hours as needed for mild pain or fever. 60 tablet 0    augmented  betamethasone dipropionate (DIPROLENE) 0.05 % external lotion APPLY 2 TIMES PER DAY TO SCALP AREAS FOR 2-3 WEEKS THEN SAT, SUN AND WED      eletriptan (RELPAX) 40 MG tablet Take 1 tablet (40 mg) by mouth at onset of headache for migraine May repeat dose in 2 hours.  Do not exceed 80 mg in 24 hours 18 tablet 3    metroNIDAZOLE (METROCREAM) 0.75 % external cream Apply topically 2 times daily.      Multiple Vitamins-Minerals (MULTIVITAMIN PO) Take by mouth daily.      sertraline (ZOLOFT) 100 MG tablet Take 1 tablet (100 mg) by mouth daily 90 tablet 3    ibuprofen (ADVIL/MOTRIN) 600 MG tablet Take 1 tablet (600 mg) by mouth every 6 hours as needed for other (mild and/or inflammatory pain.). (Patient not taking: Reported on 10/24/2024) 60 tablet 0    senna-docusate (SENOKOT-S/PERICOLACE) 8.6-50 MG tablet Take 1-2 tablets by mouth 2 times daily. (Patient not taking: Reported on 10/24/2024) 60 tablet 0     No current facility-administered medications for this visit.          Allergies:   Allergies   Allergen Reactions    No Known Allergies           Social History:  Patient lives with her  and 3 schoolage kids.   Work status: Works for [employer]. Activity: No activity limitations.  Advanced Directives: None. Desired Healthcare Power of :  Fabian Mike.  Social History     Tobacco Use    Smoking status: Never    Smokeless tobacco: Never   Substance Use Topics    Alcohol use: Yes     Alcohol/week: 0.0 standard drinks of alcohol     Comment: 2 beers a week    Drug use: No          Family History:  Family history significant for a second-degree paternal relative with colon cancer, and a second-degree maternal relative with ovarian or uterine cancer.   No known family history of breast, urothelial/renal, prostate or pancreatic cancers.  No known family history of melanoma.  Family History   Problem Relation Age of Onset    Hyperlipidemia Mother     Hypertension Mother     Cervical Cancer  "Mother     Hyperlipidemia Father     Hyperlipidemia Sister     Cervical Cancer Sister     Hyperlipidemia Brother     Ovarian Cancer Maternal Grandmother 60    Hyperlipidemia Maternal Grandmother     Coronary Artery Disease Maternal Grandfather     Deep Vein Thrombosis (DVT) Maternal Grandfather     Hyperlipidemia Paternal Grandmother     Colon Cancer Paternal Grandfather 80    Diabetes Paternal Grandfather     Hyperlipidemia Paternal Grandfather     Hypertension Paternal Grandfather     Coronary Artery Disease Paternal Grandfather     Hyperlipidemia Other     Anesthesia Reaction No family hx of          Physical Exam:   BP (!) 146/94   Pulse 63   Temp 98.1  F (36.7  C) (Temporal)   Resp 16   Ht 1.702 m (5' 7.01\")   Wt 87.6 kg (193 lb 3.2 oz)   SpO2 98%   BMI 30.25 kg/m    Body mass index is 30.25 kg/m .     General Appearance: healthy and alert, no distress     Musculoskeletal: extremities without edema    Skin: no lesions or rashes     Neurological: normal gait, no gross defects     Psychiatric: appropriate mood and affect                               Gastrointestinal:       abdomen soft, non-tender, non-distended. Laparoscopic incisions x4 clean, dry and intact, no erythema, drainage or others signs of infection.         Laboratory Examination:  I have independently reviewed the 10/14/24 pathology results detailed in the HPI.       Performance Status:  ECOG Grade 0.       Assessment:  Prema Mike is a 49 year old patient with a diagnosis of AGC Pap test, hrHPV- and abnormal uterine bleeding now s/p definitive hysterectomy with benign pathology, no dysplasia/malignancy.   Recovering well postoperatively.       Plan:   1) AGC Pap test; abnormal uterine bleeding:I reviewed the pathology results with Prema and provided her with a copy of the pathology report; also previously provided her with a copy of the pathology report via [employer].     Given the benign/negative pathology, no additional treatment " indicated.   Since LEEP for presumed cervical HSIL >25 years ago, postop vaginal cancer screening no longer indicated.     2) Genetic risk factors assessed: Does not meet criteria for Genetics referral.     3) Labs/tests ordered: None.    4) Health maintenance issues addressed today: None.    5) Code status: Full-code.     6) Prescriptions: None.      A total of 15 minutes was spent with the patient, 14 minutes of which were spent in counseling/treatment planning, 1 minutes of which were spent in postoperative care/counseling; an additional 5 minutes was spent in chart review (including review of labs) and documentation.       Vannesa Angeles MD, MS, FACOG, FACS  10/24/2024  3:41 PM

## 2024-10-24 NOTE — PATIENT INSTRUCTIONS
SCHEDULING:  None.      DIAGNOSIS:  AGC Pap test, hrHPV-. Abnormal uterine bleeding with benign pathology.   Treatment History:  -10/14/24: Pelvic exam under anesthesia, total laparoscopic hysterectomy (removal of uterus/cervix), bilateral salpingectomy (removal of bilateral fallopian tubes).       PLAN:  1) AGC Pap test, abnormal uterine bleeding: Given the benign pathology, no additional treatment or long-term surveillance is indicated.   You do NOT need screening Pap/HPV tests since your cervix has been removed.       Please call with any questions or concerns. [phone] (after-hours ask for gyn onc)       Vannesa Angeles MD, MS, FACOG, FACS  10/24/2024  3:41 PM

## 2024-10-24 NOTE — NURSING NOTE
"Oncology Rooming Note    October 24, 2024 3:02 PM   Prema Mike is a 49 year old female who presents for:    Chief Complaint   Patient presents with    Oncology Clinic Visit     Post op     Initial Vitals: BP (!) 146/94   Pulse 63   Temp 98.1  F (36.7  C) (Temporal)   Resp 16   Ht 1.702 m (5' 7.01\")   Wt 87.6 kg (193 lb 3.2 oz)   SpO2 98%   BMI 30.25 kg/m   Estimated body mass index is 30.25 kg/m  as calculated from the following:    Height as of this encounter: 1.702 m (5' 7.01\").    Weight as of this encounter: 87.6 kg (193 lb 3.2 oz). Body surface area is 2.04 meters squared.  No Pain (0) Comment: Data Unavailable   No LMP recorded.  Allergies reviewed: Yes  Medications reviewed: Yes    Medications: Medication refills not needed today.  Pharmacy name entered into [employer]: [facility] [id number] [place] [facility] [id number] [address]    Frailty Screening:   Is the patient here for a new oncology consult visit in cancer care? 2. No      Clinical concerns: Post op       Lori Connor MA              "

## 2025-02-04 NOTE — PROGRESS NOTES
SUBJECTIVE:                                                   Prema Mike is a 49 year old female who presents to clinic today for the following health issue(s):  Patient presents with:  Consult: Had hysterectomy in October and told to come back if hormones feel off. Pt c/o sour smell in vagina, fainted at a concert recently, fatigue, brain fog, depression       HPI:  Here today to discuss perimenopausal symptoms.  She had an abnormal Pap smear at her annual exam.  She underwent a colposcopy and was then referred to gynecology oncology for further management of the abnormal Pap as well as adenomyosis.  She had a total abdominal hysterectomy and salpingectomy last .  Her ovaries are intact.  She has had debilitating hot flashes, night sweats and insomnia, vaginal dryness, abdominal weight gain and brain fog with episodes of depression ever since.  Prior to her hysterectomy she had been on oral contraceptive tablets for adenomyosis management.  She was feeling really good from a perimenopausal symptomatology standpoint.    She has also noticed a sour smell vaginally.  She has not been sexually active since her surgery.    No LMP recorded..     Patient is not sexually active, .  Using hysterectomy for contraception.    reports that she has never smoked. She has never used smokeless tobacco.    STD testing offered?  Declined    Health maintenance updated:  vac    Today's PHQ-2 Score:       10/8/2024     2:01 PM   PHQ-2 (  [employer])   Q1: Little interest or pleasure in doing things 0   Q2: Feeling down, depressed or hopeless 0   PHQ-2 Score 0     Today's PHQ-9 Score:       2025     2:53 PM   PHQ-9 SCORE   PHQ-9 Total Score 9     Today's GALI-7 Score:       2025     2:53 PM   GALI-7 SCORE   Total Score 9       Problem list and histories reviewed & adjusted, as indicated.  Additional history: as documented.    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, antepartum, single gestation    Adenomyosis     Past Surgical History:   Procedure Laterality Date    EXAM UNDER ANESTHESIA PELVIC N/A 10/14/2024    Procedure: Pelvic exam under anesthesia;  Surgeon: Vannesa Angeles MD;  Location: [facility] OR    LAPAROSCOPIC HYSTERECTOMY TOTAL, SALPINGECTOMY BILATERAL Bilateral 10/14/2024    Procedure: total laparoscopic hysterectomy, bilateral salpingectomy.;  Surgeon: Vannesa Angeles MD;  Location: [facility] OR    LEEP TX, CERVICAL      done elsewhere    TOOTH EXTRACTION      [place] teeth      Social History     Tobacco Use    Smoking status: Never    Smokeless tobacco: Never   Substance Use Topics    Alcohol use: Yes     Alcohol/week: 0.0 standard drinks of alcohol     Comment: 2 beers a week      Problem (# of Occurrences) Relation (Name,Age of Onset)    Deep Vein Thrombosis (DVT) (1) Maternal Grandfather    Diabetes (1) Paternal Grandfather    Hypertension (2) Mother, Paternal Grandfather    Hyperlipidemia (8) Mother, Father, Sister, Brother, Maternal Grandmother, Paternal Grandmother, Paternal Grandfather, Other (aunt)    Coronary Artery Disease (2) Maternal Grandfather, Paternal Grandfather    Ovarian Cancer (1) Maternal Grandmother (60)    Colon Cancer (1) Paternal Grandfather (80)    Cervical Cancer (2) Mother, Sister           Negative family history of: Anesthesia Reaction              Current Outpatient Medications   Medication Sig Dispense Refill    acetaminophen (TYLENOL) 325 MG tablet Take 2 tablets (650 mg) by mouth every 6 hours as needed for mild pain or fever. 60 tablet 0    augmented betamethasone dipropionate (DIPROLENE) 0.05 % external lotion APPLY 2 TIMES PER DAY TO SCALP AREAS FOR 2-3 WEEKS THEN SAT, SUN AND WED      eletriptan (RELPAX) 40 MG tablet Take 1 tablet  (40 mg) by mouth at onset of headache for migraine May repeat dose in 2 hours.  Do not exceed 80 mg in 24 hours 18 tablet 3    ibuprofen (ADVIL/MOTRIN) 600 MG tablet Take 1 tablet (600 mg) by mouth every 6 hours as needed for other (mild and/or inflammatory pain.). 60 tablet 0    metroNIDAZOLE (METROCREAM) 0.75 % external cream Apply topically 2 times daily.      Multiple Vitamins-Minerals (MULTIVITAMIN PO) Take by mouth daily.      sertraline (ZOLOFT) 100 MG tablet Take 1 tablet (100 mg) by mouth daily 90 tablet 3    estradiol (ESTRACE) 0.1 MG/GM vaginal cream Place 1 g vaginally at bedtime. For 30 nights, then 3 times per week thereafter. Use finger to apply. 42.5 g 3    estradiol (VIVELLE-DOT) 0.05 MG/24HR bi-weekly patch Place 1 patch onto the skin twice a week. 24 patch 2    progesterone (PROMETRIUM) 200 MG capsule Take 1 capsule (200 mg) by mouth daily. 90 capsule 2     No current facility-administered medications for this visit.     Allergies   Allergen Reactions    No Known Allergies        ROS:  12 point review of systems negative other than symptoms noted below or in the HPI.  Constitutional: Fatigue and brain fog  Genitourinary: vaginal odor  Psychiatric: Depression  No urinary frequency or dysuria, bladder or kidney problems      OBJECTIVE:     /86   Wt 90.3 kg (199 lb)   BMI 31.16 kg/m    Body mass index is 31.16 kg/m .    Exam:  Constitutional:  Appearance: Well nourished, well developed alert, in no acute distress  Neurologic:  Mental Status:  Oriented X3.  Normal strength and tone, sensory exam grossly normal, mentation intact and speech normal.    Psychiatric:  Mentation appears normal and affect normal/bright.  Pelvic Exam:  External Genitalia:     Normal appearance for age, no discharge present, no tenderness present, no inflammatory lesions present, color normal  Vagina:     Normal vaginal vault without central or paravaginal defects, no discharge present, no inflammatory lesions present,  no masses present  Urethra:   Urethral Meatus:  No erythema or lesions present  Cervix:     Surgically absent  Perineum:     Perineum within normal limits, no evidence of trauma, no rashes or skin lesions present  Anus:     Anus within normal limits, no hemorrhoids present  Inguinal Lymph Nodes:     No lymphadenopathy present  Pubic Hair:     Normal pubic hair distribution for age  Genitalia and Groin:     No rashes present, no lesions present, no areas of discoloration, no masses present     In-Clinic Test Results:  No results found for this or any previous visit (from the past 24 hours).    ASSESSMENT/PLAN:                                                        ICD-10-CM    1. Vaginal odor  N89.8 Multiplex Vaginal Panel by PCR      2. Perimenopause  N95.1 estradiol (VIVELLE-DOT) 0.05 MG/24HR bi-weekly patch     progesterone (PROMETRIUM) 200 MG capsule      3. Vaginal dryness  N89.8 estradiol (ESTRACE) 0.1 MG/GM vaginal cream          There are no Patient Instructions on file for this visit.    49-year-old perimenopausal female with overwhelming perimenopausal symptoms.  Vaginal culture will be sent.  Perimenopausal therapy was discussed.  We will start her on a transdermal patch and oral progesterone and see her back for her annual exam.  Vaginal estrogen was also initiated.  Methods risk benefits were discussed at length.    NEO Durham CNP  [facility] [place]

## 2025-02-06 ENCOUNTER — OFFICE VISIT (OUTPATIENT)
Dept: OBGYN | Facility: CLINIC | Age: 50
End: 2025-02-06
Payer: COMMERCIAL

## 2025-02-06 VITALS — WEIGHT: 199 LBS | SYSTOLIC BLOOD PRESSURE: 126 MMHG | DIASTOLIC BLOOD PRESSURE: 86 MMHG | BODY MASS INDEX: 31.16 KG/M2

## 2025-02-06 DIAGNOSIS — N89.8 VAGINAL ODOR: Primary | ICD-10-CM

## 2025-02-06 DIAGNOSIS — N89.8 VAGINAL DRYNESS: ICD-10-CM

## 2025-02-06 DIAGNOSIS — N95.1 PERIMENOPAUSE: ICD-10-CM

## 2025-02-06 LAB
BACTERIAL VAGINOSIS VAG-IMP: NEGATIVE
CANDIDA DNA VAG QL NAA+PROBE: NOT DETECTED
CANDIDA GLABRATA / CANDIDA KRUSEI DNA: NOT DETECTED
T VAGINALIS DNA VAG QL NAA+PROBE: NOT DETECTED

## 2025-02-06 RX ORDER — ESTRADIOL 0.05 MG/D
1 PATCH, EXTENDED RELEASE TRANSDERMAL
Qty: 24 PATCH | Refills: 2 | Status: SHIPPED | OUTPATIENT
Start: 2025-02-06

## 2025-02-06 RX ORDER — PROGESTERONE 200 MG/1
200 CAPSULE ORAL DAILY
Qty: 90 CAPSULE | Refills: 2 | Status: SHIPPED | OUTPATIENT
Start: 2025-02-06

## 2025-02-06 RX ORDER — ESTRADIOL 0.1 MG/G
1 CREAM VAGINAL AT BEDTIME
Qty: 42.5 G | Refills: 3 | Status: SHIPPED | OUTPATIENT
Start: 2025-02-06

## 2025-02-06 ASSESSMENT — PATIENT HEALTH QUESTIONNAIRE - PHQ9
5. POOR APPETITE OR OVEREATING: SEVERAL DAYS
SUM OF ALL RESPONSES TO PHQ QUESTIONS 1-9: 9

## 2025-02-06 ASSESSMENT — ANXIETY QUESTIONNAIRES
3. WORRYING TOO MUCH ABOUT DIFFERENT THINGS: NOT AT ALL
GAD7 TOTAL SCORE: 9
2. NOT BEING ABLE TO STOP OR CONTROL WORRYING: SEVERAL DAYS
IF YOU CHECKED OFF ANY PROBLEMS ON THIS QUESTIONNAIRE, HOW DIFFICULT HAVE THESE PROBLEMS MADE IT FOR YOU TO DO YOUR WORK, TAKE CARE OF THINGS AT HOME, OR GET ALONG WITH OTHER PEOPLE: NOT DIFFICULT AT ALL
6. BECOMING EASILY ANNOYED OR IRRITABLE: NEARLY EVERY DAY
5. BEING SO RESTLESS THAT IT IS HARD TO SIT STILL: MORE THAN HALF THE DAYS
GAD7 TOTAL SCORE: 9
7. FEELING AFRAID AS IF SOMETHING AWFUL MIGHT HAPPEN: SEVERAL DAYS
1. FEELING NERVOUS, ANXIOUS, OR ON EDGE: SEVERAL DAYS

## 2025-02-17 ENCOUNTER — MYC MEDICAL ADVICE (OUTPATIENT)
Dept: OBGYN | Facility: CLINIC | Age: 50
End: 2025-02-17
Payer: COMMERCIAL

## 2025-07-24 DIAGNOSIS — F33.9 EPISODE OF RECURRENT MAJOR DEPRESSIVE DISORDER, UNSPECIFIED DEPRESSION EPISODE SEVERITY: ICD-10-CM

## 2025-07-24 RX ORDER — SERTRALINE HYDROCHLORIDE 100 MG/1
100 TABLET, FILM COATED ORAL DAILY
Qty: 90 TABLET | Refills: 0 | Status: SHIPPED | OUTPATIENT
Start: 2025-07-24

## 2025-07-24 NOTE — TELEPHONE ENCOUNTER
Requested Prescriptions   Pending Prescriptions Disp Refills    sertraline (ZOLOFT) 100 MG tablet [Pharmacy Med Name: SERTRALINE  MG TABLET] 90 tablet 3     Sig: TAKE 1 TABLET BY MOUTH EVERY DAY       SSRIs Protocol Failed - 7/24/2025  9:59 AM        Failed - PHQ-9 score less than 5 in past 6 months     Please review last PHQ-9 score.       4/27/2023     9:26 AM 6/11/2024     2:11 PM 2/6/2025     2:53 PM   PHQ-9 SCORE   PHQ-9 Total Score 8 9 9             Passed - Medication is active on med list and the sig matches. RN to manually verify dose and sig if red X/fail.     If the protocol passes (green check), you do not need to verify med dose and sig.    A prescription matches if they are the same clinical intention.    For Example: once daily and every morning are the same.    The protocol can not identify upper and lower case letters as matching and will fail.     For Example: Take 1 tablet (50 mg) by mouth daily     TAKE 1 TABLET (50 MG) BY MOUTH DAILY    For all fails (red x), verify dose and sig.    If the refill does match what is on file, the RN can still proceed to approve the refill request.       If they do not match, route to the appropriate provider.             Passed - Recent (12 month) or future (90 days) visit with authorizing provider's specialty (provided they have been seen in the past 15 months)     The patient must have completed an in-person or virtual visit within the past 12 months or has a future visit scheduled within the next 90 days with the authorizing provider s specialty.  Urgent care and e-visits do not qualify as an office visit for this protocol.          Passed - Medication indicated for associated diagnosis     Medication is associated with one or more of the following diagnoses:              Anxiety             Bipolar  Depression  Obsessive-compulsive disorder             Panic disorder  Postmenopausal flushing             Premenstrual dysphoric disorder             Social  phobia   Adjustment disorder with depressed mood   Mood disorder   Adjustment disorder with anxious mood          Passed - Patient is age 18 or older        Passed - No active pregnancy on record        Passed - No positive pregnancy test in last 12 months           Last Written Prescription Date:  6/11/24  Last Fill Quantity: 90,  # refills: 3   Last office visit: 2/6/2025 ; last virtual visit: Visit date not found with prescribing provider:  Shefali Waldron NP   Future Office Visit:  none  Annual due 6/11/25  Medication is being filled for 1 time refill only due to:  Patient needs to be seen because it has been more than one year since last visit.  Shefali Nava RN on 7/24/2025 at 10:00 AM

## (undated) DEVICE — ESU ENDO SCISSORS 5MM CVD 5DCS

## (undated) DEVICE — SU ENDO STITCH POLYSORB 0 48" 170052

## (undated) DEVICE — GLOVE BIOGEL PI MICRO SZ 6.5 48565

## (undated) DEVICE — ENDO TROCAR FIRST ENTRY KII FIOS ADV FIX 12X100MM CFF73

## (undated) DEVICE — ESU LIGASURE LAPAROSCOPIC BLUNT TIP SEALER 5MMX37CM LF1837

## (undated) DEVICE — GRASPER LAPAROSCOPIC EPIX 5MMX35CM C4130

## (undated) DEVICE — GLOVE BIOGEL PI ULTRATOUCH SZ 6.5 41165

## (undated) DEVICE — SOL NACL 0.9% INJ 1000ML BAG 2B1324X

## (undated) DEVICE — KOH COLPOTOMIZER OCCLUDER  CPO-6

## (undated) DEVICE — DEVICE ENDO STITCH APPLIER 10MM 173016

## (undated) DEVICE — LINEN TOWEL PACK X5 5464

## (undated) DEVICE — ENDO TROCAR FIRST ENTRY KII FIOS ADV FIX 05X100MM CFF03

## (undated) DEVICE — KIT PATIENT POSITIONING PIGAZZI LATEX FREE 40580

## (undated) DEVICE — SOL WATER IRRIG 1000ML BOTTLE 2F7114

## (undated) DEVICE — ESU GROUND PAD ADULT W/CORD E7507

## (undated) DEVICE — SU VICRYL 0 UR-6 27" J603H

## (undated) DEVICE — EVAC SYSTEM CLEAR FLOW SC082500

## (undated) DEVICE — SUCTION IRR STRYKERFLOW II W/TIP 250-070-520

## (undated) DEVICE — ENDO TROCAR SLEEVE KII ADV FIXATION 05X100MM CFS02

## (undated) DEVICE — ANTIFOG SOLUTION SEE SHARP 150M TROCAR SWABS 30978 (COI)

## (undated) DEVICE — ESU CORD MONOPOLAR 10'  E0510

## (undated) DEVICE — Device

## (undated) DEVICE — JELLY LUBRICATING SURGILUBE 2OZ TUBE

## (undated) DEVICE — DECANTER BAG 2002S

## (undated) DEVICE — DRAPE LEGGINGS 8421

## (undated) DEVICE — GLOVE BIOGEL PI MICRO INDICATOR UNDERGLOVE SZ 7.0 48970

## (undated) DEVICE — PACK TVT HYSTEROSCOPY SMA15HYFSE

## (undated) DEVICE — TUBING C02 INSUFFLATION LAP FILTER HEATER 6198

## (undated) DEVICE — SU VICRYL 3-0 SH 27" J316H

## (undated) DEVICE — TUBING SUCTION MEDI-VAC SOFT 3/16"X20' N520A

## (undated) DEVICE — CATH TRAY FOLEY SURESTEP 16FR WDRAIN BAG STLK LATEX A300316A

## (undated) DEVICE — WIPES FOLEY CARE SURESTEP PROVON DFC100

## (undated) DEVICE — SU VICRYL 4-0 PS-2 18" UND J496H

## (undated) DEVICE — TUBING IRRIG TUR Y TYPE 96" LF 6543-01

## (undated) DEVICE — SUCTION CANISTER MEDIVAC LINER 1500ML W/LID 65651-515

## (undated) DEVICE — ENDO TROCAR FIRST ENTRY KII FIOS ADV FIX 11X100MM CFF33

## (undated) DEVICE — RETR ELEV / UTERINE MANIPULATOR V-CARE LG CUP 60-6085-202A

## (undated) RX ORDER — OXYCODONE HYDROCHLORIDE 5 MG/1
TABLET ORAL
Status: DISPENSED
Start: 2024-10-14

## (undated) RX ORDER — HYDROMORPHONE HYDROCHLORIDE 1 MG/ML
INJECTION, SOLUTION INTRAMUSCULAR; INTRAVENOUS; SUBCUTANEOUS
Status: DISPENSED
Start: 2024-10-14

## (undated) RX ORDER — FENTANYL CITRATE 50 UG/ML
INJECTION, SOLUTION INTRAMUSCULAR; INTRAVENOUS
Status: DISPENSED
Start: 2024-10-14

## (undated) RX ORDER — FENTANYL CITRATE 0.05 MG/ML
INJECTION, SOLUTION INTRAMUSCULAR; INTRAVENOUS
Status: DISPENSED
Start: 2024-10-14

## (undated) RX ORDER — CEFAZOLIN SODIUM/WATER 2 G/20 ML
SYRINGE (ML) INTRAVENOUS
Status: DISPENSED
Start: 2024-10-14

## (undated) RX ORDER — APREPITANT 40 MG/1
CAPSULE ORAL
Status: DISPENSED
Start: 2024-10-14

## (undated) RX ORDER — HEPARIN SODIUM 5000 [USP'U]/.5ML
INJECTION, SOLUTION INTRAVENOUS; SUBCUTANEOUS
Status: DISPENSED
Start: 2024-10-14

## (undated) RX ORDER — METRONIDAZOLE 500 MG/100ML
INJECTION, SOLUTION INTRAVENOUS
Status: DISPENSED
Start: 2024-10-14

## (undated) RX ORDER — PROPOFOL 10 MG/ML
INJECTION, EMULSION INTRAVENOUS
Status: DISPENSED
Start: 2024-10-14